# Patient Record
Sex: MALE | Race: WHITE | Employment: OTHER | URBAN - METROPOLITAN AREA
[De-identification: names, ages, dates, MRNs, and addresses within clinical notes are randomized per-mention and may not be internally consistent; named-entity substitution may affect disease eponyms.]

---

## 2017-10-11 ENCOUNTER — NURSE ONLY (OUTPATIENT)
Dept: FAMILY MEDICINE CLINIC | Age: 77
End: 2017-10-11
Payer: MEDICARE

## 2017-10-11 DIAGNOSIS — Z23 NEED FOR INFLUENZA VACCINATION: Primary | ICD-10-CM

## 2017-10-11 PROCEDURE — G0008 ADMIN INFLUENZA VIRUS VAC: HCPCS | Performed by: FAMILY MEDICINE

## 2017-10-11 PROCEDURE — 90686 IIV4 VACC NO PRSV 0.5 ML IM: CPT | Performed by: FAMILY MEDICINE

## 2017-10-26 ENCOUNTER — OFFICE VISIT (OUTPATIENT)
Dept: FAMILY MEDICINE CLINIC | Age: 77
End: 2017-10-26
Payer: MEDICARE

## 2017-10-26 VITALS
WEIGHT: 151 LBS | BODY MASS INDEX: 22.96 KG/M2 | HEART RATE: 75 BPM | SYSTOLIC BLOOD PRESSURE: 102 MMHG | OXYGEN SATURATION: 98 % | DIASTOLIC BLOOD PRESSURE: 64 MMHG

## 2017-10-26 DIAGNOSIS — B35.6 TINEA CRURIS: Primary | ICD-10-CM

## 2017-10-26 PROCEDURE — 99213 OFFICE O/P EST LOW 20 MIN: CPT | Performed by: FAMILY MEDICINE

## 2017-10-26 RX ORDER — NYSTATIN 100000 [USP'U]/G
POWDER TOPICAL
Qty: 45 G | Refills: 1 | Status: SHIPPED | OUTPATIENT
Start: 2017-10-26 | End: 2018-01-03 | Stop reason: CLARIF

## 2017-10-26 NOTE — PROGRESS NOTES
Patient presents today for a possible burn on his groin area. Patient states he spilled Hot coffee on the area about a few months ago. Patient denies trouble urinating.

## 2017-10-26 NOTE — PROGRESS NOTES
HPI Notes    Name: Khanh Gutierrez  : 1940         Chief Complaint:     Chief Complaint   Patient presents with    Burn     groin        History of Present Illness:      Khanh Gutierrez is a 78 y.o.  male who presents with Burn (groin )      Rash   This is a new problem. The current episode started more than 1 month ago. The problem has been gradually worsening since onset. The affected locations include the groin. The rash is characterized by scaling and redness. He was exposed to nothing. Pertinent negatives include no cough, diarrhea, fever, shortness of breath or vomiting. Past treatments include anti-itch cream. The treatment provided no relief. No other acute problems or complaints. Past Medical History:     Past Medical History:   Diagnosis Date    GERD (gastroesophageal reflux disease)     Hyperlipidemia       Reviewed all health maintenance requirements and ordered appropriate tests  There are no preventive care reminders to display for this patient. Past Surgical History:     Past Surgical History:   Procedure Laterality Date    COLONOSCOPY      polyp    COLONOSCOPY  2016    sigmoid diverticula    INTRACAPSULAR CATARACT EXTRACTION      Bilat    TONSILLECTOMY          Medications:       Prior to Admission medications    Medication Sig Start Date End Date Taking? Authorizing Provider   nystatin (MYCOSTATIN) 400805 UNIT/GM powder Apply 3 times daily. 10/26/17  Yes Phyllistine Lennox, DO   ezetimibe (ZETIA) 10 MG tablet Take 1 tablet by mouth daily 16  Yes Phyllistine Lennox, DO   levocetirizine (XYZAL) 5 MG tablet Take 1 tablet by mouth nightly 16  Yes Lloyd Ruelas CNP   calcium carbonate (OSCAL) 500 MG TABS tablet Take 500 mg by mouth daily   Yes Historical Provider, MD        Allergies:       Review of patient's allergies indicates no known allergies. Social History:     Tobacco:    reports that he quit smoking about 47 years ago.  He does not have any smokeless tobacco history on file. Alcohol:      reports that he drinks about 0.6 oz of alcohol per week . Drug Use:  reports that he does not use drugs. Family History:     Family History   Problem Relation Age of Onset   Natick Maclachlan Parkinsonism Mother     Heart Disease Father     Crohn's Disease Sister        Review of Systems:     Positive and Negative as described in HPI    Review of Systems   Constitutional: Negative for activity change, appetite change, fever and unexpected weight change. HENT: Negative for ear pain, facial swelling and trouble swallowing. Eyes: Negative for photophobia and visual disturbance. Respiratory: Negative for cough, shortness of breath and wheezing. Cardiovascular: Negative for chest pain and palpitations. Gastrointestinal: Negative for abdominal distention, abdominal pain, constipation, diarrhea, nausea and vomiting. Endocrine: Negative for polydipsia, polyphagia and polyuria. Musculoskeletal: Negative for arthralgias, back pain, gait problem, joint swelling, myalgias, neck pain and neck stiffness. Skin: Positive for rash. Negative for color change. Allergic/Immunologic: Negative for environmental allergies and food allergies. Neurological: Negative for dizziness, syncope, weakness, light-headedness and headaches. Psychiatric/Behavioral: Negative for dysphoric mood. The patient is not nervous/anxious. Physical Exam:     Physical Exam   Constitutional: He is oriented to person, place, and time. He appears well-developed and well-nourished. HENT:   Head: Normocephalic and atraumatic. Right Ear: External ear normal.   Left Ear: External ear normal.   Eyes: Conjunctivae and EOM are normal.   Neck: Normal range of motion. Neck supple. No thyromegaly present. Cardiovascular: Normal rate, regular rhythm and normal heart sounds. Exam reveals no gallop and no friction rub. No murmur heard.   Pulmonary/Chest: Effort normal and breath sounds normal. No respiratory

## 2017-11-05 ASSESSMENT — ENCOUNTER SYMPTOMS
WHEEZING: 0
CONSTIPATION: 0
VOMITING: 0
NAUSEA: 0
FACIAL SWELLING: 0
PHOTOPHOBIA: 0
BACK PAIN: 0
ABDOMINAL DISTENTION: 0
COUGH: 0
SHORTNESS OF BREATH: 0
COLOR CHANGE: 0
DIARRHEA: 0
TROUBLE SWALLOWING: 0
ABDOMINAL PAIN: 0

## 2017-11-20 ENCOUNTER — TELEPHONE (OUTPATIENT)
Dept: FAMILY MEDICINE CLINIC | Age: 77
End: 2017-11-20

## 2017-11-20 DIAGNOSIS — E78.00 PURE HYPERCHOLESTEROLEMIA: ICD-10-CM

## 2017-11-20 RX ORDER — EZETIMIBE 10 MG/1
10 TABLET ORAL DAILY
Qty: 90 TABLET | Refills: 3 | Status: SHIPPED | OUTPATIENT
Start: 2017-11-20 | End: 2018-11-08 | Stop reason: SDUPTHER

## 2017-11-20 NOTE — TELEPHONE ENCOUNTER
Patient is asking for a refill on Zetia 10 mg take 1 tablet by mouth daily. Patient last seen 10/26/17. [facility]    Health Maintenance   Topic Date Due    DTaP/Tdap/Td vaccine (2 - Td) 09/09/2019    Lipid screen  11/04/2021    Colon cancer screen colonoscopy  01/04/2026    Zostavax vaccine  Completed    Flu vaccine  Completed    Pneumococcal low/med risk  Completed    AAA screen  Completed             (applicable per patient's age: Cancer Screenings, Depression Screening, Fall Risk Screening, Immunizations)    Hemoglobin A1C (%)   Date Value   11/04/2016 5.7   11/20/2015 6.0   07/03/2012 6.2 (H)     LDL Cholesterol (mg/dL)   Date Value   07/03/2012 121     LDL Calculated (mg/dL)   Date Value   11/04/2016 145     AST (U/L)   Date Value   11/04/2016 25     ALT (U/L)   Date Value   11/04/2016 15     BUN (mg/dL)   Date Value   11/04/2016 18      (goal A1C is < 7)   (goal LDL is <100) need 30-50% reduction from baseline     BP Readings from Last 3 Encounters:   10/26/17 102/64   11/22/16 102/66   09/24/16 129/79    (goal /80)      All Future Testing planned in [facility]:      Next Visit Date:  No future appointments.          Patient Active Problem List:     Pure hypercholesterolemia     Esophageal reflux     Other abnormal glucose     Hypertrophy of prostate without urinary obstruction and other lower urinary tract symptoms (LUTS)     Encounter for screening colonoscopy     History of colon polyps

## 2017-11-30 LAB
ALT SERPL-CCNC: 17 U/L
AST SERPL-CCNC: 24 U/L
AVERAGE GLUCOSE: NORMAL
BASOPHILS ABSOLUTE: NORMAL /ΜL
BASOPHILS RELATIVE PERCENT: NORMAL %
BUN BLDV-MCNC: 16 MG/DL
CALCIUM SERPL-MCNC: 9.4 MG/DL
CHLORIDE BLD-SCNC: 102 MMOL/L
CHOLESTEROL, TOTAL: 209 MG/DL
CHOLESTEROL/HDL RATIO: 4.4
CO2: 23 MMOL/L
CREAT SERPL-MCNC: 1.11 MG/DL
EOSINOPHILS ABSOLUTE: NORMAL /ΜL
EOSINOPHILS RELATIVE PERCENT: NORMAL %
GFR CALCULATED: >60
GLUCOSE BLD-MCNC: 106 MG/DL
HBA1C MFR BLD: 5.6 %
HCT VFR BLD CALC: 46.4 % (ref 41–53)
HDLC SERPL-MCNC: 47 MG/DL (ref 35–70)
HEMOGLOBIN: 15.6 G/DL (ref 13.5–17.5)
LDL CHOLESTEROL CALCULATED: 136 MG/DL (ref 0–160)
LYMPHOCYTES ABSOLUTE: NORMAL /ΜL
LYMPHOCYTES RELATIVE PERCENT: NORMAL %
MCH RBC QN AUTO: 30.7 PG
MCHC RBC AUTO-ENTMCNC: 33.5 G/DL
MCV RBC AUTO: 91.5 FL
MONOCYTES ABSOLUTE: NORMAL /ΜL
MONOCYTES RELATIVE PERCENT: NORMAL %
NEUTROPHILS ABSOLUTE: NORMAL /ΜL
NEUTROPHILS RELATIVE PERCENT: NORMAL %
PLATELET # BLD: 229 K/ΜL
PMV BLD AUTO: NORMAL FL
POTASSIUM SERPL-SCNC: 4.6 MMOL/L
RBC # BLD: 5.07 10^6/ΜL
SODIUM BLD-SCNC: 141 MMOL/L
TRIGL SERPL-MCNC: 128 MG/DL
TSH SERPL DL<=0.05 MIU/L-ACNC: 3.48 UIU/ML
VLDLC SERPL CALC-MCNC: NORMAL MG/DL
WBC # BLD: 5 10^3/ML

## 2017-12-06 ENCOUNTER — OFFICE VISIT (OUTPATIENT)
Dept: FAMILY MEDICINE CLINIC | Age: 77
End: 2017-12-06
Payer: MEDICARE

## 2017-12-06 VITALS
OXYGEN SATURATION: 99 % | BODY MASS INDEX: 22.81 KG/M2 | HEART RATE: 58 BPM | WEIGHT: 150 LBS | SYSTOLIC BLOOD PRESSURE: 115 MMHG | DIASTOLIC BLOOD PRESSURE: 62 MMHG

## 2017-12-06 DIAGNOSIS — R21 RASH: Primary | ICD-10-CM

## 2017-12-06 DIAGNOSIS — N50.89 SWELLING, SCROTUM: ICD-10-CM

## 2017-12-06 PROCEDURE — 99213 OFFICE O/P EST LOW 20 MIN: CPT | Performed by: FAMILY MEDICINE

## 2017-12-06 NOTE — PROGRESS NOTES
Patient presents today for Annual Exam.
Date    CHOL 209 11/30/2017    HDL 47 11/30/2017    PSA 2.82 11/13/2014    LABA1C 5.6 11/30/2017          Assessment:       1. Rash     2. Swelling, scrotum  US SCROTUM AND TESTICLES       Plan:   1. Rash-likely tinea cruris-improving/resolving. Recommend patient use the Lamisil he has at home to finish the resolution of this rash. 2.  Swelling of the scrotum-will get ultrasound of the scrotum and testicles to evaluate for scrotal mass. Addendum: Patient with a large cystic scrotal mass in the right scrotum. Will refer patient to urology for further evaluation and management. Completed Refills   Requested Prescriptions      No prescriptions requested or ordered in this encounter     Return if symptoms worsen or fail to improve. No orders of the defined types were placed in this encounter. Orders Placed This Encounter   Procedures    US SCROTUM AND TESTICLES     Standing Status:   Future     Number of Occurrences:   1     Standing Expiration Date:   12/6/2018     Order Specific Question:   Reason for exam:     Answer:   right scrotal swelling         There are no Patient Instructions on file for this visit. Electronically signed by Amena Bolden DO on 12/17/2017 at 9:08 PM         Completed Refills   Requested Prescriptions      No prescriptions requested or ordered in this encounter         Gris Felderu received counseling on the following healthy behaviors: nutrition and exercise  Reviewed prior labs and health maintenance. Continue current medications, diet and exercise. Discussed use, benefit, and side effects of prescribed medications. Barriers to medication compliance addressed. Patient given educational materials - see patient instructions. All patient questions answered. Patient voiced understanding.

## 2017-12-11 ENCOUNTER — HOSPITAL ENCOUNTER (OUTPATIENT)
Dept: ULTRASOUND IMAGING | Age: 77
Discharge: HOME | End: 2017-12-11
Payer: MEDICARE

## 2017-12-11 DIAGNOSIS — N50.89 SWELLING, SCROTUM: ICD-10-CM

## 2017-12-11 PROCEDURE — 76870 US EXAM SCROTUM: CPT

## 2017-12-13 ENCOUNTER — TELEPHONE (OUTPATIENT)
Dept: FAMILY MEDICINE CLINIC | Age: 77
End: 2017-12-13

## 2017-12-13 DIAGNOSIS — N50.89 SCROTAL MASS: Primary | ICD-10-CM

## 2017-12-17 ASSESSMENT — ENCOUNTER SYMPTOMS
BACK PAIN: 0
WHEEZING: 0
ABDOMINAL DISTENTION: 0
COLOR CHANGE: 0
CONSTIPATION: 0
DIARRHEA: 0
COUGH: 0
NAUSEA: 0
FACIAL SWELLING: 0
VOMITING: 0
ABDOMINAL PAIN: 0
PHOTOPHOBIA: 0
TROUBLE SWALLOWING: 0
SHORTNESS OF BREATH: 0

## 2017-12-21 ENCOUNTER — OFFICE VISIT (OUTPATIENT)
Dept: UROLOGY | Age: 77
End: 2017-12-21
Payer: MEDICARE

## 2017-12-21 VITALS — DIASTOLIC BLOOD PRESSURE: 70 MMHG | BODY MASS INDEX: 22.66 KG/M2 | WEIGHT: 149 LBS | SYSTOLIC BLOOD PRESSURE: 120 MMHG

## 2017-12-21 DIAGNOSIS — N50.82 SCROTAL PAIN: ICD-10-CM

## 2017-12-21 DIAGNOSIS — N50.3 EPIDIDYMAL CYST: Primary | ICD-10-CM

## 2017-12-21 PROCEDURE — 99204 OFFICE O/P NEW MOD 45 MIN: CPT | Performed by: NURSE PRACTITIONER

## 2017-12-21 NOTE — PROGRESS NOTES
Apply 3 times daily. 45 g 1    levocetirizine (XYZAL) 5 MG tablet Take 1 tablet by mouth nightly 90 tablet 1     No current facility-administered medications on file prior to visit. Seasonal  Family History   Problem Relation Age of Onset   [facility] Parkinsonism Mother     Heart Disease Father     Crohn's Disease Sister      History   Smoking Status    Former Smoker    Quit date: 1/4/1970   Smokeless Tobacco    Never Used        History   Alcohol Use    0.6 oz/week    1 Glasses of wine per week       Vitals:    12/21/17 1505   BP: 120/70     Constitutional: Patient in no acute distress; Neuro: alert and oriented to person place and time. Psych: Mood and affect normal.  Skin: Normal  Lungs: Respiratory effort normal  Cardiovascular:  Normal peripheral pulses  Abdomen: Soft, non-tender, non-distended with no CVA, flank pain, hepatosplenomegaly or hernia. Kidneys normal.  Bladder non-tender and not distended. Lymphatics: no palpable lymphadenopathy  Penis normal and circumcised  Urethral meatus normal  Scrotal large right scrotal mass. This is separate from the testicle. Multiple right epididymal cysts are palpated as well. Scrotal petechiae. Testicles normal bilaterally. No pain with palpation of the actual testicles. Epididymis normal bilaterally  No evidence of inguinal hernia    No results found for this visit on 12/21/17. Lab Results   Component Value Date    PSA 2.82 11/13/2014     Lab Results   Component Value Date    BUN 16 11/30/2017     Lab Results   Component Value Date    CREATININE 1.11 11/30/2017     Review of Systems   Constitutional: Negative for appetite change, chills and fever. Eyes: Negative for pain, redness and visual disturbance. Respiratory: Negative for cough, shortness of breath and wheezing. Cardiovascular: Negative for chest pain and leg swelling. Gastrointestinal: Negative for abdominal pain, constipation, nausea and vomiting.    Genitourinary: Positive for scrotal swelling. Negative for decreased urine volume, difficulty urinating, discharge, dysuria, enuresis, flank pain, frequency, hematuria, penile pain, testicular pain and urgency. Musculoskeletal: Negative for back pain, joint swelling and myalgias. Skin: Negative for color change, rash and wound. Neurological: Negative for dizziness, tremors and numbness. Hematological: Negative for adenopathy. Does not bruise/bleed easily. Objective:   Physical Exam    Assessment:      1. Epididymal cyst     2. Scrotal pain             Plan:      Patient I had a long discussion about surgical intervention versus monitoring of the scrotal cyst and epididymal cysts. Patient is leaving for a vacation at the beginning of February in [place]. I did explain there is a possibility that he will not be completely recovered from surgery. Patient has only minimal discomfort at this time, so we will plan to have patient back when he returns from his trip and we will discuss surgical planning at that time. I did urge him to call our office immediately if his pain or swelling becomes worse.

## 2017-12-22 PROBLEM — N50.82 SCROTAL PAIN: Status: ACTIVE | Noted: 2017-12-22

## 2017-12-22 PROBLEM — N50.3 EPIDIDYMAL CYST: Status: ACTIVE | Noted: 2017-12-22

## 2017-12-22 ASSESSMENT — ENCOUNTER SYMPTOMS
SHORTNESS OF BREATH: 0
ABDOMINAL PAIN: 0
BACK PAIN: 0
NAUSEA: 0
COLOR CHANGE: 0
WHEEZING: 0
CONSTIPATION: 0
EYE REDNESS: 0
EYE PAIN: 0
COUGH: 0
VOMITING: 0

## 2018-01-03 ENCOUNTER — OFFICE VISIT (OUTPATIENT)
Dept: PRIMARY CARE CLINIC | Age: 78
End: 2018-01-03
Payer: MEDICARE

## 2018-01-03 VITALS
DIASTOLIC BLOOD PRESSURE: 79 MMHG | SYSTOLIC BLOOD PRESSURE: 131 MMHG | BODY MASS INDEX: 21.92 KG/M2 | OXYGEN SATURATION: 98 % | HEART RATE: 66 BPM | RESPIRATION RATE: 20 BRPM | HEIGHT: 69 IN | WEIGHT: 148 LBS | TEMPERATURE: 98.6 F

## 2018-01-03 DIAGNOSIS — J01.40 ACUTE NON-RECURRENT PANSINUSITIS: Primary | ICD-10-CM

## 2018-01-03 PROCEDURE — 99213 OFFICE O/P EST LOW 20 MIN: CPT | Performed by: NURSE PRACTITIONER

## 2018-01-03 RX ORDER — FLUTICASONE PROPIONATE 50 MCG
1 SPRAY, SUSPENSION (ML) NASAL DAILY
Qty: 1 BOTTLE | Refills: 0 | Status: SHIPPED | OUTPATIENT
Start: 2018-01-03 | End: 2018-03-08

## 2018-01-03 RX ORDER — AMOXICILLIN AND CLAVULANATE POTASSIUM 875; 125 MG/1; MG/1
1 TABLET, FILM COATED ORAL 2 TIMES DAILY
Qty: 20 TABLET | Refills: 0 | Status: SHIPPED | OUTPATIENT
Start: 2018-01-03 | End: 2018-01-13

## 2018-01-03 ASSESSMENT — ENCOUNTER SYMPTOMS
STRIDOR: 0
SINUS PRESSURE: 1
SINUS PAIN: 1
WHEEZING: 0
COUGH: 1
SORE THROAT: 0
SHORTNESS OF BREATH: 0

## 2018-01-03 NOTE — PROGRESS NOTES
Skin: Negative. Hematological: Negative. Objective:     Physical Exam   Constitutional: He appears well-developed and well-nourished. He is cooperative. Appearing to be of stated age with warm and dry skin, no apparent distress; well-appearing, well-hydrated, non-toxic, comfortable, alert and oriented, pleasant and talkative, in no apparent distress. HENT:   Head: Normocephalic. Right Ear: Tympanic membrane normal.   Left Ear: Tympanic membrane normal.   Nose: Mucosal edema present. Right sinus exhibits maxillary sinus tenderness. Mouth/Throat: Uvula is midline and mucous membranes are normal.   Cardiovascular: Normal rate, regular rhythm and normal heart sounds. No murmur heard. No lower extremity edema bilaterally. Pulmonary/Chest: Effort normal and breath sounds normal.    Normal respiratory effort. Lungs clear to auscultation over anterior and posterior lung fields bilaterally. No rales, rhonchi or wheezing. No SOB or coughing noted during exam conversation. Nursing note and vitals reviewed. /79   Pulse 66   Temp 98.6 °F (37 °C) (Oral)   Resp 20   Ht 5' 9\" (1.753 m)   Wt 148 lb (67.1 kg)   SpO2 98%   BMI 21.86 kg/m²     Assessment:     1. Acute non-recurrent pansinusitis  amoxicillin-clavulanate (AUGMENTIN) 875-125 MG per tablet    fluticasone (FLONASE) 50 MCG/ACT nasal spray       Plan:   [place]ron [place] was seen today for cough, chest congestion and sinusitis. Diagnoses and all orders for this visit:    Acute non-recurrent pansinusitis  -     amoxicillin-clavulanate (AUGMENTIN) 875-125 MG per tablet; Take 1 tablet by mouth 2 times daily for 10 days  -     fluticasone (FLONASE) 50 MCG/ACT nasal spray; 1 spray by Nasal route daily    Discussed and recommended a daily probiotic OTC or yogurt (Activia, live cultured yogurt, greek). NP discussed administration and side effects of both prescribed medications. Understanding voiced.   Written instructions

## 2018-01-03 NOTE — PATIENT INSTRUCTIONS
amoxicillin and clavulanate potassium? Do not use this medication if you are allergic to amoxicillin or clavulanate potassium, or if you have ever had liver problems caused by this medication. Do not use if you are allergic to any other penicillin antibiotic, such as amoxicillin (Amoxil, Augmentin, Dispermox, Moxatag), ampicillin (Principen, Unasyn), dicloxacillin (Dycill, Dynapen), oxacillin (Bactocill), or penicillin (Bicillin L-A, PC Pen VK, Pfizerpen)), and others. To make sure you can safely take this medicine, tell your doctor if you have any of these other conditions:  · liver disease (or a history of hepatitis or jaundice);  · kidney disease;  · mononucleosis; or  · if you are allergic to a cephalosporin antibiotic, such as cefdinir (Omnicef), cefprozil (Cefzil), cefuroxime (Ceftin), cephalexin (Keflex), and others. FDA pregnancy category B. This medication is not expected to be harmful to an unborn baby. Tell your doctor if you are pregnant or plan to become pregnant during treatment. Amoxicillin and clavulanate potassium can make birth control pills less effective. Ask your doctor about using a non-hormone method of birth control (such as a condom, diaphragm, spermicide) to prevent pregnancy while taking amoxicillin and clavulanate potassium. Amoxicillin and clavulanate potassium can pass into breast milk and may harm a nursing baby. Do not use this medication without telling your doctor if you are breast-feeding a baby. The liquid and chewable tablet forms of this medication may contain phenylalanine. Talk to your doctor before using these forms of amoxicillin and clavulanate potassium if you have phenylketonuria (PKU). How should I take amoxicillin and clavulanate potassium? Take exactly as prescribed by your doctor. Do not take in larger or smaller amounts or for longer than recommended. Follow the directions on your prescription label.   If you switch from one tablet form to another (regular, unused liquid after 10 days. What happens if I miss a dose? Take the missed dose as soon as you remember. Skip the missed dose if it is almost time for your next scheduled dose. Do not take extra medicine to make up the missed dose. What happens if I overdose? Seek emergency medical attention or call the Poison Help line at 1-178.958.2949. Overdose can cause nausea, vomiting, stomach pain, diarrhea, skin rash, drowsiness, and hyperactivity. What should I avoid while taking amoxicillin and clavulanate potassium? Antibiotic medicines can cause diarrhea, which may be a sign of a new infection. If you have diarrhea that is watery or has blood in it, stop taking this medication and call your doctor. Do not use any medicine to stop the diarrhea unless your doctor has told you to. What are the possible side effects of amoxicillin and clavulanate potassium? Get emergency medical help if you have any of these signs of an allergic reaction: hives; difficulty breathing; swelling of your face, lips, tongue, or throat. Stop using this medicine and call your doctor at once if you have a serious side effect such as:  · diarrhea that is watery or has blood in it;  · pale or yellowed skin, dark colored urine, fever, confusion or weakness;  · easy bruising or bleeding;  · skin rash, bruising, severe tingling, numbness, pain, muscle weakness;  · agitation, confusion, unusual thoughts or behavior, seizure (convulsions);  · nausea, upper stomach pain, itching, loss of appetite, dark urine, corina-colored stools, jaundice (yellowing of the skin or eyes); or  · severe skin reaction -- fever, sore throat, swelling in your face or tongue, burning in your eyes, skin pain, followed by a red or purple skin rash that spreads (especially in the face or upper body) and causes blistering and peeling.   Less serious side effects may include:  · mild diarrhea, gas, stomach pain;  · nausea or vomiting;  · headache;  · skin rash or itching;  · white patches in your mouth or throat; or  · vaginal yeast infection (itching or discharge). This is not a complete list of side effects and others may occur. Call your doctor for medical advice about side effects. You may report side effects to FDA at 0-870-FDA-9444. What other drugs will affect amoxicillin and clavulanate potassium? Tell your doctor about all other medications you use, especially:  · allopurinol (Zyloprim);  · probenecid (Benemid);  · a blood thinner such as warfarin (Coumadin, Jantoven); or  · another antibiotic (for the same or for a different infection). This list is not complete and other drugs may interact with amoxicillin and clavulanate potassium. Tell your doctor about all medications you use. This includes prescription, over-the-counter, vitamin, and herbal products. Do not start a new medication without telling your doctor. Where can I get more information? Your pharmacist can provide more information about amoxicillin and clavulanate potassium. Remember, keep this and all other medicines out of the reach of children, never share your medicines with others, and use this medication only for the indication prescribed. Every effort has been made to ensure that the information provided by [address] is accurate, up-to-date, and complete, but no guarantee is made to that effect. Drug information contained herein may be time sensitive. [employer] information has been compiled for use by healthcare practitioners and consumers in the [place] and therefore [employer] does not warrant that uses outside of the [place] are appropriate, unless specifically indicated otherwise. [employer] drug information does not endorse drugs, diagnose patients or recommend therapy.  [employer] drug information is an informational resource designed to assist licensed healthcare practitioners in caring for their patients and/or to serve consumers viewing this service as a

## 2018-03-08 ENCOUNTER — OFFICE VISIT (OUTPATIENT)
Dept: UROLOGY | Age: 78
End: 2018-03-08
Payer: MEDICARE

## 2018-03-08 VITALS — WEIGHT: 153 LBS | SYSTOLIC BLOOD PRESSURE: 126 MMHG | BODY MASS INDEX: 22.59 KG/M2 | DIASTOLIC BLOOD PRESSURE: 68 MMHG

## 2018-03-08 DIAGNOSIS — N50.3 EPIDIDYMAL CYST: Primary | ICD-10-CM

## 2018-03-08 PROCEDURE — 99213 OFFICE O/P EST LOW 20 MIN: CPT | Performed by: UROLOGY

## 2018-03-08 ASSESSMENT — ENCOUNTER SYMPTOMS
WHEEZING: 0
VOMITING: 0
BACK PAIN: 0
EYE PAIN: 0
ABDOMINAL PAIN: 0
SHORTNESS OF BREATH: 0
COLOR CHANGE: 0
COUGH: 0
NAUSEA: 0
EYE REDNESS: 0

## 2018-10-03 ENCOUNTER — TELEPHONE (OUTPATIENT)
Dept: FAMILY MEDICINE CLINIC | Age: 78
End: 2018-10-03

## 2018-10-03 DIAGNOSIS — Z23 NEED FOR SHINGLES VACCINE: Primary | ICD-10-CM

## 2018-10-18 ENCOUNTER — IMMUNIZATION (OUTPATIENT)
Dept: FAMILY MEDICINE CLINIC | Age: 78
End: 2018-10-18
Payer: MEDICARE

## 2018-10-18 DIAGNOSIS — Z23 NEED FOR INFLUENZA VACCINATION: Primary | ICD-10-CM

## 2018-10-18 PROCEDURE — 90686 IIV4 VACC NO PRSV 0.5 ML IM: CPT | Performed by: FAMILY MEDICINE

## 2018-10-18 PROCEDURE — G0008 ADMIN INFLUENZA VIRUS VAC: HCPCS | Performed by: FAMILY MEDICINE

## 2018-11-01 LAB
AVERAGE GLUCOSE: NORMAL
BUN BLDV-MCNC: 18 MG/DL
CALCIUM SERPL-MCNC: 9.5 MG/DL
CHLORIDE BLD-SCNC: 100 MMOL/L
CHOLESTEROL, TOTAL: 229 MG/DL
CHOLESTEROL/HDL RATIO: 4.5
CO2: 26 MMOL/L
CREAT SERPL-MCNC: 1.09 MG/DL
GFR CALCULATED: NORMAL
GLUCOSE BLD-MCNC: 100 MG/DL
HBA1C MFR BLD: 5.4 %
HDLC SERPL-MCNC: 51 MG/DL (ref 35–70)
LDL CHOLESTEROL CALCULATED: 146 MG/DL (ref 0–160)
POTASSIUM SERPL-SCNC: 4.1 MMOL/L
PROSTATE SPECIFIC ANTIGEN: 3.82 NG/ML
SODIUM BLD-SCNC: 140 MMOL/L
TRIGL SERPL-MCNC: 162 MG/DL
TSH SERPL DL<=0.05 MIU/L-ACNC: 3.56 UIU/ML
VLDLC SERPL CALC-MCNC: NORMAL MG/DL

## 2018-11-08 DIAGNOSIS — E78.00 PURE HYPERCHOLESTEROLEMIA: ICD-10-CM

## 2018-11-08 RX ORDER — EZETIMIBE 10 MG/1
10 TABLET ORAL DAILY
Qty: 90 TABLET | Refills: 3 | Status: SHIPPED | OUTPATIENT
Start: 2018-11-08 | End: 2019-11-06 | Stop reason: SDUPTHER

## 2018-11-08 NOTE — TELEPHONE ENCOUNTER
Requesting a refill on Zetia 10 mg    [facility]    Health Maintenance   Topic Date Due    Shingles Vaccine (1 of 2 - 2 Dose Series) 02/12/2012    DTaP/Tdap/Td vaccine (2 - Td) 09/09/2019    Flu vaccine  Completed    Pneumococcal low/med risk  Completed             (applicable per patient's age: Cancer Screenings, Depression Screening, Fall Risk Screening, Immunizations)    Hemoglobin A1C (%)   Date Value   11/30/2017 5.6   11/04/2016 5.7   11/20/2015 6.0     LDL Cholesterol (mg/dL)   Date Value   07/03/2012 121     LDL Calculated (mg/dL)   Date Value   11/30/2017 136     AST (U/L)   Date Value   11/30/2017 24     ALT (U/L)   Date Value   11/30/2017 17     BUN (mg/dL)   Date Value   11/30/2017 16      (goal A1C is < 7)   (goal LDL is <100) need 30-50% reduction from baseline     BP Readings from Last 3 Encounters:   03/08/18 126/68   01/03/18 131/79   12/21/17 120/70    (goal /80)      All Future Testing planned in [facility]:      Next Visit Date:  No future appointments.          Patient Active Problem List:     Pure hypercholesterolemia     Esophageal reflux     Other abnormal glucose     Hypertrophy of prostate without urinary obstruction and other lower urinary tract symptoms (LUTS)     History of colon polyps     Epididymal cyst     Scrotal pain

## 2018-12-10 ENCOUNTER — OFFICE VISIT (OUTPATIENT)
Dept: FAMILY MEDICINE CLINIC | Age: 78
End: 2018-12-10
Payer: MEDICARE

## 2018-12-10 VITALS
WEIGHT: 148 LBS | BODY MASS INDEX: 21.92 KG/M2 | HEIGHT: 69 IN | OXYGEN SATURATION: 98 % | DIASTOLIC BLOOD PRESSURE: 70 MMHG | SYSTOLIC BLOOD PRESSURE: 126 MMHG | HEART RATE: 60 BPM

## 2018-12-10 DIAGNOSIS — K60.2 RECTAL FISSURE: ICD-10-CM

## 2018-12-10 DIAGNOSIS — J30.2 SEASONAL ALLERGIES: ICD-10-CM

## 2018-12-10 DIAGNOSIS — E78.00 PURE HYPERCHOLESTEROLEMIA: Primary | ICD-10-CM

## 2018-12-10 DIAGNOSIS — K21.9 GASTROESOPHAGEAL REFLUX DISEASE WITHOUT ESOPHAGITIS: ICD-10-CM

## 2018-12-10 PROBLEM — K64.8 OTHER HEMORRHOIDS: Status: ACTIVE | Noted: 2018-12-10

## 2018-12-10 PROCEDURE — 99213 OFFICE O/P EST LOW 20 MIN: CPT | Performed by: FAMILY MEDICINE

## 2018-12-10 ASSESSMENT — ENCOUNTER SYMPTOMS
HEARTBURN: 0
DIARRHEA: 0
CONSTIPATION: 0
ABDOMINAL PAIN: 0
TROUBLE SWALLOWING: 0
EYE DISCHARGE: 0
FACIAL SWELLING: 0
SHORTNESS OF BREATH: 0
BLOOD IN STOOL: 0
NAUSEA: 0
EYE REDNESS: 0
VOMITING: 0

## 2018-12-10 ASSESSMENT — PATIENT HEALTH QUESTIONNAIRE - PHQ9
1. LITTLE INTEREST OR PLEASURE IN DOING THINGS: 0
SUM OF ALL RESPONSES TO PHQ QUESTIONS 1-9: 0
SUM OF ALL RESPONSES TO PHQ9 QUESTIONS 1 & 2: 0
SUM OF ALL RESPONSES TO PHQ QUESTIONS 1-9: 0
2. FEELING DOWN, DEPRESSED OR HOPELESS: 0

## 2019-01-16 ENCOUNTER — TELEPHONE (OUTPATIENT)
Dept: FAMILY MEDICINE CLINIC | Age: 79
End: 2019-01-16

## 2019-01-16 RX ORDER — ATOVAQUONE AND PROGUANIL HYDROCHLORIDE 250; 100 MG/1; MG/1
1 TABLET, FILM COATED ORAL DAILY
Qty: 20 TABLET | Refills: 0 | Status: SHIPPED | OUTPATIENT
Start: 2019-01-16 | End: 2019-10-24

## 2019-10-09 ENCOUNTER — IMMUNIZATION (OUTPATIENT)
Dept: FAMILY MEDICINE CLINIC | Age: 79
End: 2019-10-09
Payer: MEDICARE

## 2019-10-09 DIAGNOSIS — Z23 NEED FOR INFLUENZA VACCINATION: Primary | ICD-10-CM

## 2019-10-09 PROCEDURE — G0008 ADMIN INFLUENZA VIRUS VAC: HCPCS | Performed by: FAMILY MEDICINE

## 2019-10-09 PROCEDURE — 90686 IIV4 VACC NO PRSV 0.5 ML IM: CPT | Performed by: FAMILY MEDICINE

## 2019-10-24 ENCOUNTER — OFFICE VISIT (OUTPATIENT)
Dept: PRIMARY CARE CLINIC | Age: 79
End: 2019-10-24
Payer: MEDICARE

## 2019-10-24 VITALS
DIASTOLIC BLOOD PRESSURE: 75 MMHG | TEMPERATURE: 98.1 F | BODY MASS INDEX: 22 KG/M2 | SYSTOLIC BLOOD PRESSURE: 126 MMHG | HEART RATE: 50 BPM | OXYGEN SATURATION: 99 % | WEIGHT: 149 LBS

## 2019-10-24 DIAGNOSIS — J01.00 ACUTE MAXILLARY SINUSITIS, RECURRENCE NOT SPECIFIED: Primary | ICD-10-CM

## 2019-10-24 PROCEDURE — 99213 OFFICE O/P EST LOW 20 MIN: CPT | Performed by: NURSE PRACTITIONER

## 2019-10-24 RX ORDER — FLUTICASONE PROPIONATE 50 MCG
2 SPRAY, SUSPENSION (ML) NASAL DAILY
Qty: 1 BOTTLE | Refills: 0 | Status: SHIPPED | OUTPATIENT
Start: 2019-10-24 | End: 2020-02-24 | Stop reason: SDUPTHER

## 2019-10-24 RX ORDER — AMOXICILLIN AND CLAVULANATE POTASSIUM 875; 125 MG/1; MG/1
1 TABLET, FILM COATED ORAL 2 TIMES DAILY
Qty: 20 TABLET | Refills: 0 | Status: SHIPPED | OUTPATIENT
Start: 2019-10-24 | End: 2019-11-03

## 2019-10-24 ASSESSMENT — ENCOUNTER SYMPTOMS
SHORTNESS OF BREATH: 0
COUGH: 1
SORE THROAT: 0
EYES NEGATIVE: 1
SINUS PAIN: 1
WHEEZING: 0
RHINORRHEA: 1
SINUS PRESSURE: 1
VOMITING: 1

## 2019-11-06 ENCOUNTER — OFFICE VISIT (OUTPATIENT)
Dept: FAMILY MEDICINE CLINIC | Age: 79
End: 2019-11-06
Payer: MEDICARE

## 2019-11-06 VITALS
HEART RATE: 56 BPM | BODY MASS INDEX: 22.59 KG/M2 | TEMPERATURE: 97.6 F | DIASTOLIC BLOOD PRESSURE: 60 MMHG | SYSTOLIC BLOOD PRESSURE: 124 MMHG | OXYGEN SATURATION: 99 % | WEIGHT: 153 LBS

## 2019-11-06 DIAGNOSIS — E78.00 PURE HYPERCHOLESTEROLEMIA: ICD-10-CM

## 2019-11-06 DIAGNOSIS — L50.9 URTICARIA: Primary | ICD-10-CM

## 2019-11-06 DIAGNOSIS — J30.2 OTHER SEASONAL ALLERGIC RHINITIS: ICD-10-CM

## 2019-11-06 PROCEDURE — 99213 OFFICE O/P EST LOW 20 MIN: CPT | Performed by: NURSE PRACTITIONER

## 2019-11-06 RX ORDER — FAMOTIDINE 20 MG/1
20 TABLET, FILM COATED ORAL 2 TIMES DAILY
Qty: 60 TABLET | Refills: 0 | Status: SHIPPED | OUTPATIENT
Start: 2019-11-06 | End: 2020-02-24 | Stop reason: ALTCHOICE

## 2019-11-06 RX ORDER — EZETIMIBE 10 MG/1
10 TABLET ORAL DAILY
Qty: 30 TABLET | Refills: 0 | Status: SHIPPED | OUTPATIENT
Start: 2019-11-06 | End: 2019-12-11 | Stop reason: SDUPTHER

## 2019-11-06 RX ORDER — LEVOCETIRIZINE DIHYDROCHLORIDE 5 MG/1
5 TABLET, FILM COATED ORAL NIGHTLY
Qty: 30 TABLET | Refills: 0 | Status: SHIPPED | OUTPATIENT
Start: 2019-11-06 | End: 2020-01-08 | Stop reason: ALTCHOICE

## 2019-11-06 RX ORDER — PREDNISONE 20 MG/1
40 TABLET ORAL DAILY
Qty: 6 TABLET | Refills: 0 | Status: SHIPPED | OUTPATIENT
Start: 2019-11-06 | End: 2019-11-09

## 2019-11-06 ASSESSMENT — ENCOUNTER SYMPTOMS
COUGH: 0
NAUSEA: 0
SHORTNESS OF BREATH: 0
DIARRHEA: 0
VOMITING: 0

## 2019-11-21 LAB
AVERAGE GLUCOSE: NORMAL
BUN BLDV-MCNC: 16 MG/DL
CALCIUM SERPL-MCNC: 10.1 MG/DL
CHLORIDE BLD-SCNC: 99 MMOL/L
CHOLESTEROL, TOTAL: 233 MG/DL
CHOLESTEROL/HDL RATIO: 4.7
CO2: 21 MMOL/L
CREAT SERPL-MCNC: 1.14 MG/DL
GFR CALCULATED: NORMAL
GLUCOSE BLD-MCNC: 118 MG/DL
HBA1C MFR BLD: 6 %
HDLC SERPL-MCNC: 50 MG/DL (ref 35–70)
LDL CHOLESTEROL CALCULATED: 158 MG/DL (ref 0–160)
POTASSIUM SERPL-SCNC: 4.2 MMOL/L
PROSTATE SPECIFIC ANTIGEN: 3.68 NG/ML
SODIUM BLD-SCNC: 139 MMOL/L
TRIGL SERPL-MCNC: 124 MG/DL
TSH SERPL DL<=0.05 MIU/L-ACNC: 2.99 UIU/ML
VLDLC SERPL CALC-MCNC: NORMAL MG/DL

## 2019-12-11 ENCOUNTER — OFFICE VISIT (OUTPATIENT)
Dept: FAMILY MEDICINE CLINIC | Age: 79
End: 2019-12-11
Payer: MEDICARE

## 2019-12-11 VITALS
WEIGHT: 152.7 LBS | HEART RATE: 64 BPM | DIASTOLIC BLOOD PRESSURE: 60 MMHG | BODY MASS INDEX: 22.62 KG/M2 | OXYGEN SATURATION: 98 % | SYSTOLIC BLOOD PRESSURE: 108 MMHG | HEIGHT: 69 IN

## 2019-12-11 DIAGNOSIS — Z00.00 MEDICARE ANNUAL WELLNESS VISIT, INITIAL: Primary | ICD-10-CM

## 2019-12-11 DIAGNOSIS — J30.2 OTHER SEASONAL ALLERGIC RHINITIS: ICD-10-CM

## 2019-12-11 DIAGNOSIS — E78.00 PURE HYPERCHOLESTEROLEMIA: ICD-10-CM

## 2019-12-11 PROCEDURE — G0438 PPPS, INITIAL VISIT: HCPCS | Performed by: FAMILY MEDICINE

## 2019-12-11 RX ORDER — EZETIMIBE 10 MG/1
10 TABLET ORAL DAILY
Qty: 90 TABLET | Refills: 1 | Status: SHIPPED | OUTPATIENT
Start: 2019-12-11 | End: 2020-05-26 | Stop reason: SDUPTHER

## 2019-12-11 RX ORDER — FAMOTIDINE 20 MG/1
20 TABLET, FILM COATED ORAL 2 TIMES DAILY
Qty: 60 TABLET | Refills: 0 | Status: CANCELLED | OUTPATIENT
Start: 2019-12-11

## 2019-12-11 RX ORDER — LEVOCETIRIZINE DIHYDROCHLORIDE 5 MG/1
5 TABLET, FILM COATED ORAL NIGHTLY
Qty: 30 TABLET | Refills: 0 | Status: CANCELLED | OUTPATIENT
Start: 2019-12-11

## 2019-12-11 RX ORDER — MONTELUKAST SODIUM 10 MG/1
10 TABLET ORAL NIGHTLY
Qty: 30 TABLET | Refills: 0 | Status: SHIPPED | OUTPATIENT
Start: 2019-12-11 | End: 2020-01-08

## 2019-12-11 ASSESSMENT — LIFESTYLE VARIABLES
HOW OFTEN DURING THE LAST YEAR HAVE YOU FOUND THAT YOU WERE NOT ABLE TO STOP DRINKING ONCE YOU HAD STARTED: 0
HOW MANY STANDARD DRINKS CONTAINING ALCOHOL DO YOU HAVE ON A TYPICAL DAY: 0
AUDIT-C TOTAL SCORE: 1
HOW OFTEN DURING THE LAST YEAR HAVE YOU BEEN UNABLE TO REMEMBER WHAT HAPPENED THE NIGHT BEFORE BECAUSE YOU HAD BEEN DRINKING: 0
HOW OFTEN DO YOU HAVE SIX OR MORE DRINKS ON ONE OCCASION: 0
HAS A RELATIVE, FRIEND, DOCTOR, OR ANOTHER HEALTH PROFESSIONAL EXPRESSED CONCERN ABOUT YOUR DRINKING OR SUGGESTED YOU CUT DOWN: 0
HAVE YOU OR SOMEONE ELSE BEEN INJURED AS A RESULT OF YOUR DRINKING: 0
AUDIT TOTAL SCORE: 1
HOW OFTEN DURING THE LAST YEAR HAVE YOU NEEDED AN ALCOHOLIC DRINK FIRST THING IN THE MORNING TO GET YOURSELF GOING AFTER A NIGHT OF HEAVY DRINKING: 0
HOW OFTEN DURING THE LAST YEAR HAVE YOU HAD A FEELING OF GUILT OR REMORSE AFTER DRINKING: 0
HOW OFTEN DO YOU HAVE A DRINK CONTAINING ALCOHOL: 1
HOW OFTEN DURING THE LAST YEAR HAVE YOU FAILED TO DO WHAT WAS NORMALLY EXPECTED FROM YOU BECAUSE OF DRINKING: 0

## 2019-12-11 ASSESSMENT — PATIENT HEALTH QUESTIONNAIRE - PHQ9
SUM OF ALL RESPONSES TO PHQ QUESTIONS 1-9: 1
SUM OF ALL RESPONSES TO PHQ QUESTIONS 1-9: 1

## 2020-01-08 ENCOUNTER — OFFICE VISIT (OUTPATIENT)
Dept: FAMILY MEDICINE CLINIC | Age: 80
End: 2020-01-08
Payer: MEDICARE

## 2020-01-08 VITALS
HEART RATE: 54 BPM | BODY MASS INDEX: 22.45 KG/M2 | SYSTOLIC BLOOD PRESSURE: 130 MMHG | WEIGHT: 152 LBS | DIASTOLIC BLOOD PRESSURE: 70 MMHG | OXYGEN SATURATION: 98 %

## 2020-01-08 PROCEDURE — 99213 OFFICE O/P EST LOW 20 MIN: CPT | Performed by: FAMILY MEDICINE

## 2020-01-08 PROCEDURE — G8510 SCR DEP NEG, NO PLAN REQD: HCPCS | Performed by: FAMILY MEDICINE

## 2020-01-08 RX ORDER — LEVOCETIRIZINE DIHYDROCHLORIDE 5 MG/1
5 TABLET, FILM COATED ORAL NIGHTLY
Qty: 30 TABLET | Refills: 5 | Status: SHIPPED | OUTPATIENT
Start: 2020-01-08 | End: 2020-08-10 | Stop reason: SDUPTHER

## 2020-01-08 ASSESSMENT — ENCOUNTER SYMPTOMS
DIARRHEA: 0
EYE DISCHARGE: 0
NAUSEA: 0
FACIAL SWELLING: 0
ABDOMINAL PAIN: 0
EYE REDNESS: 0
VOMITING: 0
EYE ITCHING: 1
COUGH: 1

## 2020-01-08 ASSESSMENT — PATIENT HEALTH QUESTIONNAIRE - PHQ9
1. LITTLE INTEREST OR PLEASURE IN DOING THINGS: 0
2. FEELING DOWN, DEPRESSED OR HOPELESS: 0
SUM OF ALL RESPONSES TO PHQ QUESTIONS 1-9: 0
SUM OF ALL RESPONSES TO PHQ9 QUESTIONS 1 & 2: 0
SUM OF ALL RESPONSES TO PHQ QUESTIONS 1-9: 0

## 2020-01-30 ENCOUNTER — OFFICE VISIT (OUTPATIENT)
Dept: PRIMARY CARE CLINIC | Age: 80
End: 2020-01-30
Payer: MEDICARE

## 2020-01-30 VITALS
WEIGHT: 152.7 LBS | OXYGEN SATURATION: 100 % | RESPIRATION RATE: 16 BRPM | DIASTOLIC BLOOD PRESSURE: 66 MMHG | SYSTOLIC BLOOD PRESSURE: 115 MMHG | TEMPERATURE: 97.6 F | HEART RATE: 58 BPM | BODY MASS INDEX: 22.55 KG/M2

## 2020-01-30 PROCEDURE — 99213 OFFICE O/P EST LOW 20 MIN: CPT | Performed by: NURSE PRACTITIONER

## 2020-01-30 RX ORDER — OFLOXACIN 3 MG/ML
5 SOLUTION AURICULAR (OTIC) 2 TIMES DAILY
Qty: 5 ML | Refills: 0 | Status: SHIPPED | OUTPATIENT
Start: 2020-01-30 | End: 2020-02-06

## 2020-01-30 ASSESSMENT — ENCOUNTER SYMPTOMS
RHINORRHEA: 0
VOMITING: 0
WHEEZING: 0
NAUSEA: 0
COUGH: 0
SHORTNESS OF BREATH: 0
SORE THROAT: 0
FACIAL SWELLING: 0
DIARRHEA: 0

## 2020-01-30 NOTE — PROGRESS NOTES
route daily 1 Bottle 0     No current facility-administered medications for this visit. Allergies   Allergen Reactions    Seasonal        Subjective:     Review of Systems   Constitutional: Negative for appetite change, chills, diaphoresis, fatigue and fever. HENT: Positive for ear pain. Negative for congestion, ear discharge, facial swelling, rhinorrhea and sore throat. Respiratory: Negative for cough, shortness of breath and wheezing. Gastrointestinal: Negative for diarrhea, nausea and vomiting. Skin: Negative for rash and wound. Neurological: Negative for dizziness, light-headedness and headaches. Objective:      Physical Exam  Vitals signs and nursing note reviewed. Constitutional:       General: He is not in acute distress. Appearance: Normal appearance. He is well-developed. He is not ill-appearing or diaphoretic. HENT:      Head: Normocephalic and atraumatic. Right Ear: Hearing, ear canal and external ear normal. No drainage. No middle ear effusion. No mastoid tenderness. Tympanic membrane is not injected, erythematous or bulging. Left Ear: Hearing, ear canal and external ear normal. Swelling (Minimal edema and erythema to the external auditory canal.) and tenderness (Tenderness with otoscope insertion. Small amount of white debris noted to external auditory canal.) present. No drainage. No middle ear effusion. No mastoid tenderness. Tympanic membrane is not injected, erythematous or bulging. Nose: Nose normal. No mucosal edema, congestion or rhinorrhea. Right Sinus: No maxillary sinus tenderness or frontal sinus tenderness. Left Sinus: No maxillary sinus tenderness or frontal sinus tenderness. Mouth/Throat:      Lips: Pink. Mouth: Mucous membranes are moist.      Pharynx: Oropharynx is clear. Uvula midline. Eyes:      Conjunctiva/sclera: Conjunctivae normal.      Pupils: Pupils are equal, round, and reactive to light.    Neck: Musculoskeletal: Neck supple. Cardiovascular:      Rate and Rhythm: Regular rhythm. Bradycardia present. Heart sounds: Normal heart sounds, S1 normal and S2 normal. No murmur. No friction rub. No gallop. Pulmonary:      Effort: Pulmonary effort is normal. No accessory muscle usage or respiratory distress. Breath sounds: Normal breath sounds and air entry. No decreased breath sounds, wheezing, rhonchi or rales. Comments: No cough. Breath sounds clear B/L anterior and posterior lobes. Chest expansion symmetrical.  No audible wheezing or respiratory distress. No rales or rhonchi. Lymphadenopathy:      Cervical: No cervical adenopathy. Right cervical: No superficial or posterior cervical adenopathy. Left cervical: No superficial or posterior cervical adenopathy. Skin:     General: Skin is warm and dry. Coloration: Skin is not pale. Findings: No erythema or rash. Neurological:      Mental Status: He is alert and oriented to person, place, and time. Psychiatric:         Behavior: Behavior normal. Behavior is cooperative. /66   Pulse 58   Temp 97.6 °F (36.4 °C)   Resp 16   Wt 152 lb 11.2 oz (69.3 kg)   SpO2 100%   BMI 22.55 kg/m²     Assessment:      Diagnosis Orders   1. Other infective acute otitis externa of left ear  ofloxacin (FLOXIN) 0.3 % otic solution       Plan:      Return if symptoms worsen or fail to improve, for Resume all previous medications as directed. Orders Placed This Encounter   Medications    ofloxacin (FLOXIN) 0.3 % otic solution     Sig: Place 5 drops into the left ear 2 times daily for 7 days     Dispense:  5 mL     Refill:  0      · Warm ear drops before instillation by rubbing in palms of hand for few minutes. · Ear Drops should be instilled while in side lying position with affected ear up.     · Gently pull ear lobe and move around to improve movement of drops into ear canal.  · Do not wear ear plugs or use hearing aids until pain has resolved. · Keep ears clean and dry, avoid getting water in the ears. · No swimming or submerging head in water for 7 to 10 days. · Ofloxacin 0.3% ear drops, 5 drops into affected ear twice per day for 7 days. · Tylenol or Ibuprofen OTC as directed on package for pain/discomfort. · Patient instructions given for acute otitis externa and ofloxacin. · To ER or call 911 if any difficulty breathing, shortness of breath, inability to swallow, hives, rash, facial/tongue swelling or temp greater than 103 degrees. · Follow up as needed with PCP or Walk in Care if symptoms worsen or do not improve       Juan received counseling on the following healthy behaviors: medication adherence. Patient given educational materials - see patient instructions. Discussed use,benefit, and side effects of prescribed medications. Treatment plan discussed at visit. Continue routine health care follow up. All patient questions answered. Pt voiced understanding.       Electronically signed by MARGARETH Jimenez CNP on 1/30/2020 at 1:58 PM

## 2020-01-30 NOTE — PATIENT INSTRUCTIONS
the drops to body temperature by rolling the container in your hands. Or you can place it in a cup of warm water for a few minutes. · Lie down, with your ear facing up. · Place drops inside the ear. Follow your doctor's instructions (or the directions on the label) for how many drops to use. Gently wiggle the outer ear or pull the ear up and back to help the drops get into the ear. · It's important to keep the liquid in the ear canal for 3 to 5 minutes. When should you call for help? Call your doctor now or seek immediate medical care if:    · You have a new or higher fever.     · You have new or worse pain, swelling, warmth, or redness around or behind your ear.     · You have new or increasing pus or blood draining from your ear.    Watch closely for changes in your health, and be sure to contact your doctor if:    · You are not getting better after 2 days (48 hours). Where can you learn more? Go to https://[employer]. org and sign in to your [employer] account. Enter C706 in the [employer] box to learn more about \"Swimmer's Ear: Care Instructions. \"     If you do not have an account, please click on the \"Sign Up Now\" link. Current as of: July 28, 2019  Content Version: 12.3  © 3001-3511 Healthwise, Incorporated. Care instructions adapted under license by [facility] ([facility]). If you have questions about a medical condition or this instruction, always ask your healthcare professional. [place] [address] any warranty or liability for your use of this information. Patient Education        ofloxacin otic  Pronunciation:  oh FLOCKS a sin OH tic  Brand:  Floxin Otic  What is the most important information I should know about ofloxacin otic? Follow all directions on your medicine label and package. Tell each of your healthcare providers about all your medical conditions, allergies, and all medicines you use. What is ofloxacin otic?   Ofloxacin is an antibiotic that facial/tongue swelling or temp greater than 103 degrees.   · Follow up as needed with PCP or Walk in Care if symptoms worsen or do not improve

## 2020-02-24 ENCOUNTER — OFFICE VISIT (OUTPATIENT)
Dept: FAMILY MEDICINE CLINIC | Age: 80
End: 2020-02-24
Payer: MEDICARE

## 2020-02-24 VITALS
WEIGHT: 152 LBS | TEMPERATURE: 97.7 F | BODY MASS INDEX: 22.45 KG/M2 | DIASTOLIC BLOOD PRESSURE: 80 MMHG | HEART RATE: 52 BPM | SYSTOLIC BLOOD PRESSURE: 130 MMHG | OXYGEN SATURATION: 96 %

## 2020-02-24 PROCEDURE — 99213 OFFICE O/P EST LOW 20 MIN: CPT | Performed by: FAMILY MEDICINE

## 2020-02-24 RX ORDER — FLUTICASONE PROPIONATE 50 MCG
2 SPRAY, SUSPENSION (ML) NASAL DAILY
Qty: 1 BOTTLE | Refills: 0 | Status: SHIPPED | OUTPATIENT
Start: 2020-02-24

## 2020-02-24 ASSESSMENT — ENCOUNTER SYMPTOMS
EYE DISCHARGE: 0
EYE REDNESS: 0
VOMITING: 0
SORE THROAT: 0
TROUBLE SWALLOWING: 0
RHINORRHEA: 0
FACIAL SWELLING: 0

## 2020-02-24 NOTE — PATIENT INSTRUCTIONS
SURVEY:    You may be receiving a survey from [employer] regarding your visit today. Please complete the survey to enable us to provide the highest quality of care to you and your family. If you cannot score us a very good on any question, please call the office to discuss how we could have made your experience a very good one. Thank you.

## 2020-02-24 NOTE — PROGRESS NOTES
requirements and ordered appropriate tests  There are no preventive care reminders to display for this patient. Past Surgical History:     Past Surgical History:   Procedure Laterality Date    COLONOSCOPY  2006    polyp    COLONOSCOPY  01/04/2016    sigmoid diverticula    INTRACAPSULAR CATARACT EXTRACTION      Bilat    TONSILLECTOMY          Medications:       Prior to Admission medications    Medication Sig Start Date End Date Taking? Authorizing Provider   fluticasone (FLONASE) 50 MCG/ACT nasal spray 2 sprays by Each Nostril route daily 2/24/20  Yes Hai Yoo MD   levocetirizine (XYZAL) 5 MG tablet Take 1 tablet by mouth nightly 1/8/20  Yes Hai Yoo MD   ezetimibe (ZETIA) 10 MG tablet Take 1 tablet by mouth daily 12/11/19  Yes Hai Yoo MD   calcium carbonate (OSCAL) 500 MG TABS tablet Take 500 mg by mouth daily Two tablet a day   Yes Historical Provider, MD        Allergies:       Seasonal    Social History:     Tobacco:    reports that he quit smoking about 50 years ago. His smoking use included cigarettes. He has a 2.50 pack-year smoking history. He has never used smokeless tobacco.  Alcohol:      reports current alcohol use of about 1.0 standard drinks of alcohol per week. Drug Use:  reports no history of drug use. Family History:     Family History   Problem Relation Age of Onset   [address] [facility] Parkinsonism Mother     Heart Disease Father     Crohn's Disease Sister        Review of Systems:       Review of Systems   Constitutional: Negative for chills and fever. HENT: Positive for ear pain. Negative for ear discharge, facial swelling, rhinorrhea, sore throat and trouble swallowing. Eyes: Negative for discharge and redness. Gastrointestinal: Negative for vomiting. Skin: Negative for rash. Neurological: Negative for dizziness, facial asymmetry and headaches. Physical Exam:     Physical Exam  Vitals signs reviewed. Constitutional:       Appearance: Normal appearance. HENT:      Right Ear: Tympanic membrane, ear canal and external ear normal. There is no impacted cerumen. Left Ear: Ear canal and external ear normal. A middle ear effusion is present. There is no impacted cerumen. Ears:      Comments: Lt TM minimal bulging and dull, no erythema      Nose: No congestion or rhinorrhea. Eyes:      General:         Right eye: No discharge. Left eye: No discharge. Neck:      Musculoskeletal: Neck supple. No muscular tenderness. Lymphadenopathy:      Cervical: No cervical adenopathy. Neurological:      Mental Status: He is alert. Vitals:  /80   Pulse 52   Temp 97.7 °F (36.5 °C)   Wt 152 lb (68.9 kg)   SpO2 96%   BMI 22.45 kg/m²       Data:     Lab Results   Component Value Date     11/21/2019    K 4.2 11/21/2019    CL 99 11/21/2019    CO2 21 11/21/2019    BUN 16 11/21/2019    CREATININE 1.14 11/21/2019    GLUCOSE 118 11/21/2019    LABALBU 3.9 07/03/2012    BILITOT 1.05 07/03/2012    ALKPHOS 59 07/03/2012    AST 24 11/30/2017    ALT 17 11/30/2017     Lab Results   Component Value Date    WBC 5.0 11/30/2017    RBC 5.07 11/30/2017    HGB 15.6 11/30/2017    HCT 46.4 11/30/2017    MCV 91.5 11/30/2017    MCH 30.7 11/30/2017    MCHC 33.5 11/30/2017    RDW 12.5 01/13/2014     11/30/2017    MPV NOT REPORTED 01/13/2014     Lab Results   Component Value Date    TSH 2.99 11/21/2019     Lab Results   Component Value Date    CHOL 233 11/21/2019    HDL 50 11/21/2019    PSA 3.68 11/21/2019    LABA1C 6.0 11/21/2019          Assessment/Plan:        1. Eustachian tube dysfunction, left  Pt used to take flonase but has stopped fo awhile now. Pt encouraged to go back on the flonase daily if helping for 2-4wks and may even add for 1-2 weeks claritin or zyrtec. F/u if not better or worsening symptoms       Return if symptoms worsen or fail to improve.       Electronically signed by Estephania Bermudez MD on 2/24/2020 at 10:23 AM

## 2020-03-12 ENCOUNTER — HOSPITAL ENCOUNTER (EMERGENCY)
Age: 80
Discharge: HOME | End: 2020-03-12
Attending: EMERGENCY MEDICINE
Payer: MEDICARE

## 2020-03-12 ENCOUNTER — OFFICE VISIT (OUTPATIENT)
Dept: PRIMARY CARE CLINIC | Age: 80
End: 2020-03-12
Payer: MEDICARE

## 2020-03-12 VITALS
DIASTOLIC BLOOD PRESSURE: 71 MMHG | RESPIRATION RATE: 20 BRPM | HEIGHT: 69 IN | HEART RATE: 70 BPM | OXYGEN SATURATION: 100 % | BODY MASS INDEX: 23.11 KG/M2 | WEIGHT: 156 LBS | TEMPERATURE: 98 F | SYSTOLIC BLOOD PRESSURE: 135 MMHG

## 2020-03-12 VITALS
TEMPERATURE: 98 F | OXYGEN SATURATION: 95 % | BODY MASS INDEX: 23.04 KG/M2 | WEIGHT: 156 LBS | SYSTOLIC BLOOD PRESSURE: 134 MMHG | DIASTOLIC BLOOD PRESSURE: 78 MMHG | HEART RATE: 56 BPM

## 2020-03-12 LAB
INFLUENZA A ANTIBODY: NEGATIVE
INFLUENZA B ANTIBODY: NEGATIVE

## 2020-03-12 PROCEDURE — 87804 INFLUENZA ASSAY W/OPTIC: CPT | Performed by: NURSE PRACTITIONER

## 2020-03-12 PROCEDURE — 6370000000 HC RX 637 (ALT 250 FOR IP): Performed by: EMERGENCY MEDICINE

## 2020-03-12 PROCEDURE — 99283 EMERGENCY DEPT VISIT LOW MDM: CPT

## 2020-03-12 RX ORDER — IBUPROFEN 200 MG
600 TABLET ORAL ONCE
Status: COMPLETED | OUTPATIENT
Start: 2020-03-12 | End: 2020-03-12

## 2020-03-12 RX ADMIN — IBUPROFEN 600 MG: 200 TABLET, FILM COATED ORAL at 13:04

## 2020-03-12 ASSESSMENT — ENCOUNTER SYMPTOMS
COLOR CHANGE: 0
NAUSEA: 0
COUGH: 0
VOMITING: 0
BACK PAIN: 0
TROUBLE SWALLOWING: 0
DIARRHEA: 0
EYE REDNESS: 0
SHORTNESS OF BREATH: 0
ABDOMINAL PAIN: 0
EYE PAIN: 0
SORE THROAT: 0

## 2020-03-12 ASSESSMENT — PAIN SCALES - GENERAL
PAINLEVEL_OUTOF10: 0
PAINLEVEL_OUTOF10: 0

## 2020-03-12 NOTE — ED PROVIDER NOTES
Social History Narrative    Not on file           PHYSICAL EXAM    (up to 7 for level 4, 8 ormore for level 5)     ED Triage Vitals [03/12/20 1249]   BP Temp Temp src Pulse Resp SpO2 Height Weight   (!) 166/77 98 °F (36.7 °C) -- 70 20 100 % 5' 9\" (1.753 m) 156 lb (70.8 kg)       Physical Exam     Physical    Vital signs and nursing notes were reviewed as well as the social, family, and past medical history. Gen. appearance: Patient is alert and oriented and in no acute distress    Head: Atraumatic, normocephalic    Neck: Supple, trachea/thyroid normal    EENT: PERRLA, EOMI, conjunctiva normal.    Skin: Warm and dry with no rash    Cardiovascular: Heart RRR, no gallops or rubs, no aortic enlargement or bruits noted. Respiratory: Lungs clear, no wheezing, no rales, normal breath sounds. Gastrointestinal: Abdomen nontender, bowel sounds normal, no rebound/guarding/distention or mass    Musculoskeletal: No tenderness in the extremities, no back or hip pain. Neurological: Patient is alert and oriented ×3, no focal motor or sensory deficits noted      DIAGNOSTIC RESULTS             LABS:  Labs Reviewed - No data to display    All other labs were within normal range or not returned as of this dictation. EMERGENCY DEPARTMENT COURSE and DIFFERENTIAL DIAGNOSIS/MDM:   Vitals:    Vitals:    03/12/20 1321 03/12/20 1336 03/12/20 1351 03/12/20 1406   BP: (!) 147/65 131/62 (!) 142/60 (!) 144/66   Pulse:       Resp:       Temp:       SpO2: 97% 97% 97% 98%   Weight:       Height:                     REASSESSMENT      In the ED patient is asymptomatic with a pulse ox of 98% and wants to go home and feels good. His influenza screen from the walk-in clinic was negative and patient will be discharged home. Is advised to self quarantine avoid exposing other people to what ever virus he has at this time. PROCEDURES:  Unless otherwise noted below, none     Procedures    FINAL IMPRESSION      1.  Viral URI

## 2020-03-12 NOTE — DISCHARGE INSTR - COC
Continuity of Care Form    Patient Name: Caleb Bobo   :  1940  MRN:  [id number]    Admit date:  3/12/2020  Discharge date:  ***    Code Status Order: Prior   Advance Directives:     Admitting Physician:  No admitting provider for patient encounter.   PCP: Adam Miranda MD    Discharging Nurse: [facility] Unit/Room#:   Discharging Unit Phone Number: ***    Emergency Contact:   Extended Emergency Contact Information  Primary Emergency Contact: Moisés August  Address: [address] [place], [place] [address] [place] [address] Phone: [phone]  Relation: Spouse    Past Surgical History:  Past Surgical History:   Procedure Laterality Date    COLONOSCOPY  2006    polyp    COLONOSCOPY  2016    sigmoid diverticula    INTRACAPSULAR CATARACT EXTRACTION      Bilat    TONSILLECTOMY         Immunization History:   Immunization History   Administered Date(s) Administered    Hepatitis A 2008, 10/09/2008    Hepatitis A Ped/Adol (Havrix, Vaqta) 2008, 10/09/2008    Hepatitis B 2009, 10/09/2009, 2010    Hepatitis B Ped/Adol (Engerix-B, Recombivax HB) 2009, 10/09/2009, 2010    Influenza 2011, 10/18/2012, 10/23/2013    Influenza Vaccine, unspecified formulation 10/23/2013    Influenza Virus Vaccine 10/15/2014, 10/23/2015    Influenza, Quadv, IM, PF (6 mo and older Fluzone, Flulaval, Fluarix, and 3 yrs and older Afluria) 2016, 10/11/2017, 10/18/2018, 10/09/2019    MMR 09/15/2009    Meningococcal MCV4P (Menactra) 2009    PPD Test 2014    Pneumococcal Conjugate 13-valent (Kalnric22) 2016    Pneumococcal Polysaccharide ([id number]) 09/15/2009    Polio IPV (IPOL) 2009    Tdap (Boostrix, Adacel) 2009, 2019    Yellow Fever (YF-Vax) 10/13/2009    Zoster Live (Zostavax) 2011    Zoster Recombinant (Shingrix) 2019, 2019       Active Problems:  Patient Active Problem List XGIVHGZ:402634706}  Last Modified Barium Swallow with Video (Video Swallowing Test): {Done Not Done EWXF:600361066}    Treatments at the Time of Hospital Discharge:   Respiratory Treatments: ***  Oxygen Therapy:  {Therapy; copd oxygen:03073}  Ventilator:    { CC Vent BKMM:901188995}    Rehab Therapies: {THERAPEUTIC INTERVENTION:8156745699}  Weight Bearing Status/Restrictions: {[facility] Weight Bearin}  Other Medical Equipment (for information only, NOT a DME order):  {EQUIPMENT:200560565}  Other Treatments: ***    Patient's personal belongings (please select all that are sent with patient):  {P DME Belongings:597054107}    RN SIGNATURE:  {Esignature:687320416}    CASE MANAGEMENT/SOCIAL WORK SECTION    Inpatient Status Date: ***    Readmission Risk Assessment Score:  Readmission Risk              Risk of Unplanned Readmission:        0           Discharging to Facility/ Agency   · Name:   · Address:  · Phone:  · Fax:    Dialysis Facility (if applicable)   · Name:  · Address:  · Dialysis Schedule:  · Phone:  · Fax:    / signature: {Esignature:592574344}    PHYSICIAN SECTION    Prognosis: {Prognosis:3791798838}    Condition at Discharge: [address] [place] [address] Patient Condition:908998207}    Rehab Potential (if transferring to Rehab): {Prognosis:4128326137}    Recommended Labs or Other Treatments After Discharge: ***    Physician Certification: I certify the above information and transfer of Rebecca Ramirez  is necessary for the continuing treatment of the diagnosis listed and that he requires {Admit to Appropriate Level of Care:16730} for {GREATER/LESS:587793420} 30 days.      Update Admission H&P: {CHP DME Changes in UYSPS:524449110}    PHYSICIAN SIGNATURE:  {Esignature:339751139}

## 2020-03-12 NOTE — PATIENT INSTRUCTIONS
SURVEY:    You may be receiving a survey from [employer] regarding your visit today. Please complete the survey to enable us to provide the highest quality of care to you and your family. If you cannot score us a very good on any question, please call the office to discuss how we could of made your experience a very good one. Thank you.

## 2020-03-13 ENCOUNTER — TELEPHONE (OUTPATIENT)
Dept: FAMILY MEDICINE CLINIC | Age: 80
End: 2020-03-13

## 2020-08-10 RX ORDER — LEVOCETIRIZINE DIHYDROCHLORIDE 5 MG/1
5 TABLET, FILM COATED ORAL NIGHTLY
Qty: 30 TABLET | Refills: 5 | Status: SHIPPED | OUTPATIENT
Start: 2020-08-10 | End: 2021-01-19 | Stop reason: SDUPTHER

## 2020-08-10 NOTE — TELEPHONE ENCOUNTER
xyzal 5 mg    [facility] Maintenance   Topic Date Due    Flu vaccine (1) 09/01/2020    Annual Wellness Visit (AWV)  12/11/2020    DTaP/Tdap/Td vaccine (3 - Td) 12/11/2029    Shingles Vaccine  Completed    Pneumococcal 65+ years Vaccine  Completed    Hepatitis A vaccine  Aged Out    Hepatitis B vaccine  Aged Out    Hib vaccine  Aged Out    Meningococcal (ACWY) vaccine  Aged Out             (applicable per patient's age: Cancer Screenings, Depression Screening, Fall Risk Screening, Immunizations)    Hemoglobin A1C (%)   Date Value   11/21/2019 6.0   11/01/2018 5.4   11/30/2017 5.6     LDL Cholesterol (mg/dL)   Date Value   07/03/2012 121     LDL Calculated (mg/dL)   Date Value   11/21/2019 158     AST (U/L)   Date Value   11/30/2017 24     ALT (U/L)   Date Value   11/30/2017 17     BUN (mg/dL)   Date Value   11/21/2019 16      (goal A1C is < 7)   (goal LDL is <100) need 30-50% reduction from baseline     BP Readings from Last 3 Encounters:   03/12/20 135/71   03/12/20 134/78   02/24/20 130/80    (goal /80)      All Future Testing planned in [facility]:      Next Visit Date:  No future appointments.          Patient Active Problem List:     Pure hypercholesterolemia     Esophageal reflux     Other abnormal glucose     Hypertrophy of prostate without urinary obstruction and other lower urinary tract symptoms (LUTS)     History of colon polyps     Epididymal cyst     Scrotal pain     Seasonal allergies     Other hemorrhoids

## 2020-12-28 ENCOUNTER — TELEPHONE (OUTPATIENT)
Dept: FAMILY MEDICINE CLINIC | Age: 80
End: 2020-12-28

## 2020-12-28 RX ORDER — EZETIMIBE 10 MG/1
10 TABLET ORAL DAILY
Qty: 90 TABLET | Refills: 1 | Status: SHIPPED | OUTPATIENT
Start: 2020-12-28 | End: 2021-07-09 | Stop reason: SDUPTHER

## 2020-12-28 NOTE — TELEPHONE ENCOUNTER
Patient needs script for Zetia - patient uses [facility]    Health Maintenance   Topic Date Due    Hepatitis C screen  1940    Annual Wellness Visit (AWV)  06/20/2019    DTaP/Tdap/Td vaccine (3 - Td) 12/11/2029    Flu vaccine  Completed    Shingles Vaccine  Completed    Pneumococcal 65+ years Vaccine  Completed    Hepatitis A vaccine  Aged Out    Hepatitis B vaccine  Aged Out    Hib vaccine  Aged Out    Meningococcal (ACWY) vaccine  Aged Out             (applicable per patient's age: Cancer Screenings, Depression Screening, Fall Risk Screening, Immunizations)    Hemoglobin A1C (%)   Date Value   11/21/2019 6.0   11/01/2018 5.4   11/30/2017 5.6     LDL Cholesterol (mg/dL)   Date Value   07/03/2012 121     LDL Calculated (mg/dL)   Date Value   11/21/2019 158     AST (U/L)   Date Value   11/30/2017 24     ALT (U/L)   Date Value   11/30/2017 17     BUN (mg/dL)   Date Value   11/21/2019 16      (goal A1C is < 7)   (goal LDL is <100) need 30-50% reduction from baseline     BP Readings from Last 3 Encounters:   03/12/20 135/71   03/12/20 134/78   02/24/20 130/80    (goal /80)      All Future Testing planned in [facility]:      Next Visit Date:  Future Appointments   Date Time Provider Rashmi Montemayor   1/19/2021  9:00 AM [facility]            Patient Active Problem List:     Pure hypercholesterolemia     Esophageal reflux     Other abnormal glucose     Hypertrophy of prostate without urinary obstruction and other lower urinary tract symptoms (LUTS)     History of colon polyps     Epididymal cyst     Scrotal pain     Seasonal allergies     Other hemorrhoids

## 2021-01-13 DIAGNOSIS — Z23 NEED FOR VACCINATION: ICD-10-CM

## 2021-01-19 ENCOUNTER — OFFICE VISIT (OUTPATIENT)
Dept: FAMILY MEDICINE CLINIC | Age: 81
End: 2021-01-19
Payer: MEDICARE

## 2021-01-19 ENCOUNTER — HOSPITAL ENCOUNTER (OUTPATIENT)
Age: 81
Discharge: HOME | End: 2021-01-19
Payer: MEDICARE

## 2021-01-19 VITALS
DIASTOLIC BLOOD PRESSURE: 70 MMHG | HEIGHT: 70 IN | OXYGEN SATURATION: 98 % | HEART RATE: 64 BPM | WEIGHT: 148 LBS | BODY MASS INDEX: 21.19 KG/M2 | SYSTOLIC BLOOD PRESSURE: 118 MMHG

## 2021-01-19 DIAGNOSIS — Z12.5 PROSTATE CANCER SCREENING: ICD-10-CM

## 2021-01-19 DIAGNOSIS — E78.00 PURE HYPERCHOLESTEROLEMIA: ICD-10-CM

## 2021-01-19 DIAGNOSIS — Z00.00 MEDICARE ANNUAL WELLNESS VISIT, SUBSEQUENT: Primary | ICD-10-CM

## 2021-01-19 DIAGNOSIS — J30.2 SEASONAL ALLERGIES: ICD-10-CM

## 2021-01-19 DIAGNOSIS — R73.9 HYPERGLYCEMIA: ICD-10-CM

## 2021-01-19 LAB
ANION GAP SERPL CALCULATED.3IONS-SCNC: 9 MMOL/L (ref 9–17)
BUN BLDV-MCNC: 13 MG/DL (ref 8–23)
BUN/CREAT BLD: 12 (ref 9–20)
CALCIUM SERPL-MCNC: 9.9 MG/DL (ref 8.6–10.4)
CHLORIDE BLD-SCNC: 100 MMOL/L (ref 98–107)
CHOLESTEROL/HDL RATIO: 3.8
CHOLESTEROL: 184 MG/DL
CO2: 27 MMOL/L (ref 20–31)
CREAT SERPL-MCNC: 1.08 MG/DL (ref 0.7–1.2)
ESTIMATED AVERAGE GLUCOSE: 114 MG/DL
GFR AFRICAN AMERICAN: >60 ML/MIN
GFR NON-AFRICAN AMERICAN: >60 ML/MIN
GFR SERPL CREATININE-BSD FRML MDRD: ABNORMAL ML/MIN/{1.73_M2}
GFR SERPL CREATININE-BSD FRML MDRD: ABNORMAL ML/MIN/{1.73_M2}
GLUCOSE BLD-MCNC: 100 MG/DL (ref 70–99)
HBA1C MFR BLD: 5.6 % (ref 4–6)
HDLC SERPL-MCNC: 49 MG/DL
LDL CHOLESTEROL: 115 MG/DL (ref 0–130)
PATIENT FASTING?: YES
POTASSIUM SERPL-SCNC: 4.4 MMOL/L (ref 3.7–5.3)
PROSTATE SPECIFIC ANTIGEN: 2.89 UG/L
SODIUM BLD-SCNC: 136 MMOL/L (ref 135–144)
TRIGL SERPL-MCNC: 101 MG/DL
VLDLC SERPL CALC-MCNC: NORMAL MG/DL (ref 1–30)

## 2021-01-19 PROCEDURE — 36415 COLL VENOUS BLD VENIPUNCTURE: CPT

## 2021-01-19 PROCEDURE — G0439 PPPS, SUBSEQ VISIT: HCPCS | Performed by: FAMILY MEDICINE

## 2021-01-19 PROCEDURE — 80061 LIPID PANEL: CPT

## 2021-01-19 PROCEDURE — G0103 PSA SCREENING: HCPCS

## 2021-01-19 PROCEDURE — 83036 HEMOGLOBIN GLYCOSYLATED A1C: CPT

## 2021-01-19 PROCEDURE — 80048 BASIC METABOLIC PNL TOTAL CA: CPT

## 2021-01-19 RX ORDER — LEVOCETIRIZINE DIHYDROCHLORIDE 5 MG/1
5 TABLET, FILM COATED ORAL NIGHTLY
Qty: 90 TABLET | Refills: 3 | Status: SHIPPED | OUTPATIENT
Start: 2021-01-19 | End: 2022-04-11 | Stop reason: SDUPTHER

## 2021-01-19 ASSESSMENT — PATIENT HEALTH QUESTIONNAIRE - PHQ9
SUM OF ALL RESPONSES TO PHQ QUESTIONS 1-9: 0
SUM OF ALL RESPONSES TO PHQ QUESTIONS 1-9: 0
2. FEELING DOWN, DEPRESSED OR HOPELESS: 0
1. LITTLE INTEREST OR PLEASURE IN DOING THINGS: 0

## 2021-01-19 ASSESSMENT — LIFESTYLE VARIABLES
HOW OFTEN DO YOU HAVE SIX OR MORE DRINKS ON ONE OCCASION: 0
HOW OFTEN DO YOU HAVE A DRINK CONTAINING ALCOHOL: 1
HAVE YOU OR SOMEONE ELSE BEEN INJURED AS A RESULT OF YOUR DRINKING: 0
HAS A RELATIVE, FRIEND, DOCTOR, OR ANOTHER HEALTH PROFESSIONAL EXPRESSED CONCERN ABOUT YOUR DRINKING OR SUGGESTED YOU CUT DOWN: 0
AUDIT TOTAL SCORE: 1
HOW OFTEN DURING THE LAST YEAR HAVE YOU NEEDED AN ALCOHOLIC DRINK FIRST THING IN THE MORNING TO GET YOURSELF GOING AFTER A NIGHT OF HEAVY DRINKING: 0

## 2021-01-19 NOTE — PROGRESS NOTES
Medicare Annual Wellness Visit       Efraín Home  YOB: 1940    Date of Service:  1/19/2021    Patient Active Problem List   Diagnosis    Pure hypercholesterolemia    Esophageal reflux    Other abnormal glucose    Hypertrophy of prostate without urinary obstruction and other lower urinary tract symptoms (LUTS)    History of colon polyps    Epididymal cyst    Scrotal pain    Seasonal allergies    Other hemorrhoids       Allergies   Allergen Reactions    Seasonal      Outpatient Medications Marked as Taking for the 1/19/21 encounter (Office Visit) with Arpan Mae MD   Medication Sig Dispense Refill    ezetimibe (ZETIA) 10 MG tablet Take 1 tablet by mouth daily 90 tablet 1    levocetirizine (XYZAL) 5 MG tablet Take 1 tablet by mouth nightly 30 tablet 5    calcium carbonate (OSCAL) 500 MG TABS tablet Take 500 mg by mouth daily Two tablet a day         Past Medical History:   Diagnosis Date    GERD (gastroesophageal reflux disease)     Hyperlipidemia      Past Surgical History:   Procedure Laterality Date    COLONOSCOPY  2006    polyp    COLONOSCOPY  01/04/2016    sigmoid diverticula    INTRACAPSULAR CATARACT EXTRACTION      Bilat    TONSILLECTOMY       Family History   Problem Relation Age of Onset    Parkinsonism Mother     Heart Disease Father     Crohn's Disease Sister      Social History     Socioeconomic History    Marital status:      Spouse name: Not on file    Number of children: Not on file    Years of education: Not on file    Highest education level: Not on file   Occupational History    Not on file   Social Needs    Financial resource strain: Not on file    Food insecurity     Worry: Not on file     Inability: Not on file    Transportation needs     Medical: Not on file     Non-medical: Not on file   Tobacco Use    Smoking status: Former Smoker     Packs/day: 0.25     Years: 10.00     Pack years: 2.50     Types: Cigarettes     Quit date: 1/4/1970 Years since quittin.0    Smokeless tobacco: Never Used   Substance and Sexual Activity    Alcohol use: Yes     Alcohol/week: 1.0 standard drinks     Types: 1 Glasses of wine per week     Comment: rare    Drug use: No    Sexual activity: Not on file   Lifestyle    Physical activity     Days per week: Not on file     Minutes per session: Not on file    Stress: Not on file   Relationships    Social connections     Talks on phone: Not on file     Gets together: Not on file     Attends Congregational service: Not on file     Active member of club or organization: Not on file     Attends meetings of clubs or organizations: Not on file     Relationship status: Not on file    Intimate partner violence     Fear of current or ex partner: Not on file     Emotionally abused: Not on file     Physically abused: Not on file     Forced sexual activity: Not on file   Other Topics Concern    Not on file   Social History Narrative    Not on file       Consultants:   Patient Care Team:  Inga Dickinson MD as PCP - General (Family Medicine)  Inga Dickinson MD [facility]    Wt Readings from Last 3 Encounters:   21 148 lb (67.1 kg)   20 156 lb (70.8 kg)   20 156 lb (70.8 kg)     BP Readings from Last 3 Encounters:   21 118/70   20 135/71   20 134/78       Pertinent Physical Exam    Vitals:    21 0903   BP: 118/70   Pulse: 64   SpO2: 98%   Weight: 148 lb (67.1 kg)   Height: 5' 10\" (1.778 m)     Body mass index is 21.24 kg/m².      ROS (information related to health maintenance and screening)  10 systems reviewed and all WNL except seasonal allergies     Physical Exam (Minimal exam needed for wellness visit)  General Appearance: alert and oriented to person, place and time, well-developed and well-nourished, in no acute distress  Skin: warm and dry, no rash or erythema  Head: normocephalic and atraumatic  Eyes: pupils equal, conjunctivae normal  ENT: bilateral tympanic membrane normal, bilateral external ear normal and bilateral ear canal normal  Neck: neck supple and non tender without mass, no thyromegaly, no cervical lymphadenopathy   Pulmonary/Chest: clear to auscultation bilaterally - no wheezes, rales or rhonchi, normal air movement  Cardiovascular: normal rate, regular rhythm, no murmurs and no carotid bruits  Abdomen: soft, non-tender, non-distended, normal bowels sounds, and no masses  Extremities: no erythema, swelling or tenderness of extremities  Musculoskeletal: normal range of motion, no joint swelling or tenderness  Neurologic: no cranial nerve deficit, gait and coordination normal, speech normal and no tremor      Current Health Maintenance Status  Health Maintenance   Topic Date Due    Hepatitis C screen  1940    Annual Wellness Visit (AWV)  06/20/2019    DTaP/Tdap/Td vaccine (3 - Td) 12/11/2029    Flu vaccine  Completed    Shingles Vaccine  Completed    Pneumococcal 65+ years Vaccine  Completed    Hepatitis A vaccine  Aged Out    Hepatitis B vaccine  Aged Out    Hib vaccine  Aged Out    Meningococcal (ACWY) vaccine  Aged [employer] AWV Workflow and Risk Assessment    Review Medicare Health Risk Assessment form completed by patient  Document vitals, height included (3 vital signs minimum)      Update Allergies and Medications    complete  Update Family and Social History (HRA #2-6)    complete  Update Health Maintenance (HRA #7-13)    complete  Update Vaccines in Historical Immunizations (HRA #9-10)    complete  Update Patient Care Team- in [place] ([place] #14)     complete  Has patient seen an eye specialist within the past 2 years (HRA #7)? yes     Has patient seen a dentist within the past year (HRA #11)? yes    Medical Suppliers Used (diabetic supplies, 02, medical equipment, etc.) (HRA #14):  none    End of Life Planning (HRA # 15):   Advanced Directive:  yes,   copy on file      [facility] 652 form completed by patient, reviewed and scanned into chart. HRA interpretation guide- enter risks identified through screenings into table below    1. Behavioral Risks:    Tobacco:  If patient responded \"yes\" to HRA question #6, screen is positive. Result - negative  Alcohol use: Add up scores of alcohol use questions (HRA # 3-5)- positive result is 3 or above for women and 4 or above for men. Result - negative  Physical activity:  If patient responded \"no\" to HRA question #16, screen is positive. Result - negative  Nutrition:  Body mass index is 21.24 kg/m². [employer] If patient responded \"yes\" to HRA question #17, or BMI <18 or >30, screen is positive. Result - negative    BEHAVIORAL RISKS IDENTIFIED:   None identified      2. Psychosocial Risks:  Anxiety:  Add up scores of anxiety questions (HRA #18-19)- positive result is 3 or above, or if patient has current or past diagnosis of anxiety. Result - negative  Depression:  Add up scores of PHQ-2 (HRA #20-21): positive result is 3 or above, or if patient has current or past diagnosis of depression. Result - negative  Social/Emotional support:  If patient responded \"sometimes,\" or \"rarely/never\" to HRA question #22, screen is positive. Result - negative  Pain:  If patient responded \"a lot\" to HRA question #24, screen is positive. Result - negative    PSYCHOSOCIAL RISKS IDENTIFIED:   None identified      3. Functional/Safety Risks:    Memory:  Mini-Cognitive Exam   A. Mini-Cog Screen:  Give patient the following words to remember and ask to repeat- advise patient that he will be asked to recall items later:  Whitney RAMIREZ [address], [address]. Clock Drawing Test (CDT): Have patient draw the face of a clock and put the numbers in the correct positions. Then draw in the hands at ten minutes after eleven.   Score clock drawing test as \"normal\" if the patient places the correct time and the clock appears grossly normal, or \"abnormal.\"    CDT: Normal      C. The patient is then asked to recall the three words. (This should be done after one minute, or after the Clock Drawing Test is complete)(if remembers all 3 or none of the words, then do not need to perform CDT)   Mini-Cog Scorin/3 words recalled       MINI-COG INTERPRETATION: 1 or 2 with normal CDT- Negative for cognitive impairment     Living situation:  If patient responded \"lives alone\" to HRA question #23, screen is positive. Result - negative  Hearing: If patient responded \"yes\" to HRA question #25, screen is positive. Result - negative  Safety:  If patient responded \"no\" to any of the HRA questions #26-31, screen is positive. Result - negative  ADLs:  If patient responded \"yes\" to HRA questions #32 or #33, screen is positive. Result - negative  Fall risk:  Per MA note, If timed Get Up & Go test unsteady or longer than 20 seconds, or falls reported per HRA question #34, screen is positive. Complete outpatient fall risk assessment in document flow sheet Result - negative     Vision: (corrected vision)    Left eye  Right eye  Both eyes      20/    20/    20/       SAFETY RISKS IDENTIFIED:   None identified      Scan HRA form into media section of chart. Perform Vision Screening at Visit not performed      Personalized Preventive Plan   This plan is provided to the patient in written form.        Preventive plan of care for Orville Mock        2021           Preventive Measures Status       Recommendations for screening   Prostate Cancer Screen  Lab Results   Component Value Date    PSA 3.68 2019     Screening covered annually  This test is not clinically indicated    Colon Cancer Screen  Last colonoscopy: 16 Screening covered every 10 years  Repeat screening is not clinically indicated   Diabetes Screen  Glucose (mg/dL)   Date Value   2019 118    Screening covered annually, every 6 months if pre-diabetic  Repeat yearly   Cholesterol Screen  Lab Results   Component Treatment options   Behavioral Risks  · None identified   · No treatment is necessary   Psychosocial Risks  · None identified   · No treatment is necessary    Functional/Safety Risks  · None identified   · No treatment is necessary     Other Recommendations/Plans ·   needs labs but otherwise UTD                Visit Diagnosis   Diagnosis Orders   1. Medicare annual wellness visit, subsequent     2. Pure hypercholesterolemia  Lipid Panel    Basic Metabolic Panel   3. Seasonal allergies     4. Hyperglycemia  Hemoglobin A1C   5.  Prostate cancer screening  Psa screening

## 2021-01-24 NOTE — PROGRESS NOTES
Eyes:      Conjunctiva/sclera: Conjunctivae normal.   Cardiovascular:      Rate and Rhythm: Normal rate. Heart sounds: Normal heart sounds. Pulmonary:      Effort: Pulmonary effort is normal. No respiratory distress. Breath sounds: Normal breath sounds. No rhonchi. Neurological:      Mental Status: He is alert. Vitals:  /70   Pulse 54   Wt 152 lb (68.9 kg)   SpO2 98%   BMI 22.45 kg/m²       Data:     Lab Results   Component Value Date     11/21/2019    K 4.2 11/21/2019    CL 99 11/21/2019    CO2 21 11/21/2019    BUN 16 11/21/2019    CREATININE 1.14 11/21/2019    GLUCOSE 118 11/21/2019    LABALBU 3.9 07/03/2012    BILITOT 1.05 07/03/2012    ALKPHOS 59 07/03/2012    AST 24 11/30/2017    ALT 17 11/30/2017     Lab Results   Component Value Date    WBC 5.0 11/30/2017    RBC 5.07 11/30/2017    HGB 15.6 11/30/2017    HCT 46.4 11/30/2017    MCV 91.5 11/30/2017    MCH 30.7 11/30/2017    MCHC 33.5 11/30/2017    RDW 12.5 01/13/2014     11/30/2017    MPV NOT REPORTED 01/13/2014     Lab Results   Component Value Date    TSH 2.99 11/21/2019     Lab Results   Component Value Date    CHOL 233 11/21/2019    HDL 50 11/21/2019    PSA 3.68 11/21/2019    LABA1C 6.0 11/21/2019          Assessment/Plan:        1. Seasonal allergies  Stable and pt will stop the singulair and take the xyzal and flonase as needed for flare ups with the weather. Return in about 1 year (around 1/8/2021), or if symptoms worsen or fail to improve.       Electronically signed by Hai Yoo MD on 1/8/2020 at 9:38 AM
patient

## 2021-07-09 ENCOUNTER — TELEPHONE (OUTPATIENT)
Dept: FAMILY MEDICINE CLINIC | Age: 81
End: 2021-07-09

## 2021-07-09 DIAGNOSIS — E78.00 PURE HYPERCHOLESTEROLEMIA: ICD-10-CM

## 2021-07-09 RX ORDER — EZETIMIBE 10 MG/1
10 TABLET ORAL DAILY
Qty: 90 TABLET | Refills: 1 | Status: SHIPPED | OUTPATIENT
Start: 2021-07-09 | End: 2022-01-03 | Stop reason: SDUPTHER

## 2021-07-09 NOTE — TELEPHONE ENCOUNTER
Last OV: 1/19/2021 AWV  Last RX:  Next scheduled apt: Visit date not found        Sure scripts request      RX pending

## 2021-07-09 NOTE — TELEPHONE ENCOUNTER
Patient is asking for a refill on Zetia 10 mg to [facility]. Patient last seen 1/19/21. No future appt found. Health Maintenance   Topic Date Due    Hepatitis C screen  Never done    COVID-19 Vaccine (1) Never done    Flu vaccine (1) 09/01/2021    Annual Wellness Visit (AWV)  01/20/2022    DTaP/Tdap/Td vaccine (3 - Td or Tdap) 12/11/2029    Shingles Vaccine  Completed    Pneumococcal 65+ years Vaccine  Completed    Hepatitis A vaccine  Aged Out    Hepatitis B vaccine  Aged Out    Hib vaccine  Aged Out    Meningococcal (ACWY) vaccine  Aged Out             (applicable per patient's age: Cancer Screenings, Depression Screening, Fall Risk Screening, Immunizations)    Hemoglobin A1C (%)   Date Value   01/19/2021 5.6   11/21/2019 6.0   11/01/2018 5.4     LDL Cholesterol (mg/dL)   Date Value   01/19/2021 115     LDL Calculated (mg/dL)   Date Value   11/21/2019 158     AST (U/L)   Date Value   11/30/2017 24     ALT (U/L)   Date Value   11/30/2017 17     BUN (mg/dL)   Date Value   01/19/2021 13      (goal A1C is < 7)   (goal LDL is <100) need 30-50% reduction from baseline     BP Readings from Last 3 Encounters:   01/19/21 118/70   03/12/20 135/71   03/12/20 134/78    (goal /80)      All Future Testing planned in [facility]:      Next Visit Date:  No future appointments.          Patient Active Problem List:     Pure hypercholesterolemia     Esophageal reflux     Other abnormal glucose     Hypertrophy of prostate without urinary obstruction and other lower urinary tract symptoms (LUTS)     History of colon polyps     Epididymal cyst     Scrotal pain     Seasonal allergies     Other hemorrhoids

## 2022-01-03 DIAGNOSIS — E78.00 PURE HYPERCHOLESTEROLEMIA: ICD-10-CM

## 2022-01-03 RX ORDER — EZETIMIBE 10 MG/1
10 TABLET ORAL DAILY
Qty: 90 TABLET | Refills: 1 | Status: SHIPPED | OUTPATIENT
Start: 2022-01-03 | End: 2022-04-11 | Stop reason: SDUPTHER

## 2022-01-24 ENCOUNTER — OFFICE VISIT (OUTPATIENT)
Dept: FAMILY MEDICINE CLINIC | Age: 82
End: 2022-01-24
Payer: MEDICARE

## 2022-01-24 ENCOUNTER — HOSPITAL ENCOUNTER (OUTPATIENT)
Age: 82
Discharge: HOME | End: 2022-01-24
Payer: MEDICARE

## 2022-01-24 VITALS
SYSTOLIC BLOOD PRESSURE: 120 MMHG | HEART RATE: 72 BPM | DIASTOLIC BLOOD PRESSURE: 70 MMHG | OXYGEN SATURATION: 98 % | WEIGHT: 148 LBS | BODY MASS INDEX: 21.19 KG/M2 | HEIGHT: 70 IN

## 2022-01-24 DIAGNOSIS — Z12.5 PROSTATE CANCER SCREENING: ICD-10-CM

## 2022-01-24 DIAGNOSIS — N40.1 BENIGN PROSTATIC HYPERPLASIA WITH WEAK URINARY STREAM: ICD-10-CM

## 2022-01-24 DIAGNOSIS — Z00.00 ROUTINE GENERAL MEDICAL EXAMINATION AT A HEALTH CARE FACILITY: Primary | ICD-10-CM

## 2022-01-24 DIAGNOSIS — R39.12 BENIGN PROSTATIC HYPERPLASIA WITH WEAK URINARY STREAM: ICD-10-CM

## 2022-01-24 DIAGNOSIS — E78.00 PURE HYPERCHOLESTEROLEMIA: ICD-10-CM

## 2022-01-24 LAB
ALBUMIN SERPL-MCNC: 4.1 G/DL (ref 3.5–5.2)
ALBUMIN/GLOBULIN RATIO: ABNORMAL (ref 1–2.5)
ALP BLD-CCNC: 67 U/L (ref 40–129)
ALT SERPL-CCNC: 15 U/L (ref 5–41)
ANION GAP SERPL CALCULATED.3IONS-SCNC: 11 MMOL/L (ref 9–17)
AST SERPL-CCNC: 20 U/L
BILIRUB SERPL-MCNC: 0.46 MG/DL (ref 0.3–1.2)
BUN BLDV-MCNC: 15 MG/DL (ref 8–23)
BUN/CREAT BLD: 15 (ref 9–20)
CALCIUM SERPL-MCNC: 9.3 MG/DL (ref 8.6–10.4)
CHLORIDE BLD-SCNC: 102 MMOL/L (ref 98–107)
CHOLESTEROL/HDL RATIO: 3.9
CHOLESTEROL: 189 MG/DL
CO2: 25 MMOL/L (ref 20–31)
CREAT SERPL-MCNC: 1.02 MG/DL (ref 0.7–1.2)
GFR AFRICAN AMERICAN: >60 ML/MIN
GFR NON-AFRICAN AMERICAN: >60 ML/MIN
GFR SERPL CREATININE-BSD FRML MDRD: ABNORMAL ML/MIN/{1.73_M2}
GFR SERPL CREATININE-BSD FRML MDRD: ABNORMAL ML/MIN/{1.73_M2}
GLUCOSE BLD-MCNC: 106 MG/DL (ref 70–99)
HDLC SERPL-MCNC: 48 MG/DL
LDL CHOLESTEROL: 119 MG/DL (ref 0–130)
PATIENT FASTING?: YES
POTASSIUM SERPL-SCNC: 4.6 MMOL/L (ref 3.7–5.3)
PROSTATE SPECIFIC ANTIGEN: 2.57 UG/L
SODIUM BLD-SCNC: 138 MMOL/L (ref 135–144)
TOTAL PROTEIN: 6.7 G/DL (ref 6.4–8.3)
TRIGL SERPL-MCNC: 108 MG/DL
VLDLC SERPL CALC-MCNC: NORMAL MG/DL (ref 1–30)

## 2022-01-24 PROCEDURE — G0439 PPPS, SUBSEQ VISIT: HCPCS | Performed by: FAMILY MEDICINE

## 2022-01-24 PROCEDURE — 99213 OFFICE O/P EST LOW 20 MIN: CPT | Performed by: FAMILY MEDICINE

## 2022-01-24 PROCEDURE — G0103 PSA SCREENING: HCPCS

## 2022-01-24 PROCEDURE — 36415 COLL VENOUS BLD VENIPUNCTURE: CPT

## 2022-01-24 PROCEDURE — 80053 COMPREHEN METABOLIC PANEL: CPT

## 2022-01-24 PROCEDURE — 80061 LIPID PANEL: CPT

## 2022-01-24 SDOH — ECONOMIC STABILITY: FOOD INSECURITY: WITHIN THE PAST 12 MONTHS, YOU WORRIED THAT YOUR FOOD WOULD RUN OUT BEFORE YOU GOT MONEY TO BUY MORE.: NEVER TRUE

## 2022-01-24 SDOH — ECONOMIC STABILITY: FOOD INSECURITY: WITHIN THE PAST 12 MONTHS, THE FOOD YOU BOUGHT JUST DIDN'T LAST AND YOU DIDN'T HAVE MONEY TO GET MORE.: NEVER TRUE

## 2022-01-24 ASSESSMENT — PATIENT HEALTH QUESTIONNAIRE - PHQ9
2. FEELING DOWN, DEPRESSED OR HOPELESS: 0
SUM OF ALL RESPONSES TO PHQ QUESTIONS 1-9: 0
SUM OF ALL RESPONSES TO PHQ9 QUESTIONS 1 & 2: 0
SUM OF ALL RESPONSES TO PHQ QUESTIONS 1-9: 0
SUM OF ALL RESPONSES TO PHQ QUESTIONS 1-9: 0
1. LITTLE INTEREST OR PLEASURE IN DOING THINGS: 0
SUM OF ALL RESPONSES TO PHQ QUESTIONS 1-9: 0

## 2022-01-24 ASSESSMENT — ENCOUNTER SYMPTOMS
TROUBLE SWALLOWING: 0
NAUSEA: 0
FACIAL SWELLING: 0
COUGH: 0
DIARRHEA: 0
EYE REDNESS: 0
BLOOD IN STOOL: 0
ABDOMINAL PAIN: 0
VOMITING: 0
SHORTNESS OF BREATH: 0
EYE DISCHARGE: 0

## 2022-01-24 ASSESSMENT — LIFESTYLE VARIABLES
HOW OFTEN DO YOU HAVE A DRINK CONTAINING ALCOHOL: 1
HOW OFTEN DO YOU HAVE SIX OR MORE DRINKS ON ONE OCCASION: 0
HAVE YOU OR SOMEONE ELSE BEEN INJURED AS A RESULT OF YOUR DRINKING: 0
HOW OFTEN DURING THE LAST YEAR HAVE YOU FOUND THAT YOU WERE NOT ABLE TO STOP DRINKING ONCE YOU HAD STARTED: 0
HOW OFTEN DURING THE LAST YEAR HAVE YOU HAD A FEELING OF GUILT OR REMORSE AFTER DRINKING: 0
HAS A RELATIVE, FRIEND, DOCTOR, OR ANOTHER HEALTH PROFESSIONAL EXPRESSED CONCERN ABOUT YOUR DRINKING OR SUGGESTED YOU CUT DOWN: 0
HOW OFTEN DURING THE LAST YEAR HAVE YOU BEEN UNABLE TO REMEMBER WHAT HAPPENED THE NIGHT BEFORE BECAUSE YOU HAD BEEN DRINKING: 0
HOW OFTEN DURING THE LAST YEAR HAVE YOU NEEDED AN ALCOHOLIC DRINK FIRST THING IN THE MORNING TO GET YOURSELF GOING AFTER A NIGHT OF HEAVY DRINKING: 0
HOW OFTEN DURING THE LAST YEAR HAVE YOU FAILED TO DO WHAT WAS NORMALLY EXPECTED FROM YOU BECAUSE OF DRINKING: 0

## 2022-01-24 ASSESSMENT — SOCIAL DETERMINANTS OF HEALTH (SDOH): HOW HARD IS IT FOR YOU TO PAY FOR THE VERY BASICS LIKE FOOD, HOUSING, MEDICAL CARE, AND HEATING?: NOT HARD AT ALL

## 2022-01-24 NOTE — PROGRESS NOTES
HPI Notes    Name: Karlos Werner  : 1940        Chief Complaint:     Chief Complaint   Patient presents with    Medicare AWV    Benign Prostatic Hypertrophy     Pt wants referral to urology. Pt noted slower stream       History of Present Illness:     Karlos Werner is a 80 y.o.  male who presents with Medicare AWV and Benign Prostatic Hypertrophy (Pt wants referral to urology. Pt noted slower stream)      HPI  BPH- Pt has noted some weaker urine stream recently. No blood in urine. No pain with urination. Pt tries to keep hydrated. Pt had a \"ream job\" for his prostate years ago with Dr Funmilayo Beck. Pt would like to [facility]rolog[facility] at [facility], Dr Adia collins. Hypercholesterolemia - chronic but stable. Pt does take the zetia daily. Past Medical History:     Past Medical History:   Diagnosis Date    GERD (gastroesophageal reflux disease)     Hyperlipidemia       Reviewed all health maintenance requirements and ordered appropriate tests  Health Maintenance Due   Topic Date Due    Hepatitis C screen  Never done    Annual Wellness Visit (AWV)  2022       Past Surgical History:     Past Surgical History:   Procedure Laterality Date    COLONOSCOPY      polyp    COLONOSCOPY  2016    sigmoid diverticula    INTRACAPSULAR CATARACT EXTRACTION      Bilat    TONSILLECTOMY          Medications:       Prior to Admission medications    Medication Sig Start Date End Date Taking?  Authorizing Provider   ezetimibe (ZETIA) 10 MG tablet Take 1 tablet by mouth daily 1/3/22  Yes Marylin Medley MD   levocetirizine (XYZAL) 5 MG tablet Take 1 tablet by mouth nightly 21  Yes Marylin Medley MD   calcium carbonate (OSCAL) 500 MG TABS tablet Take 500 mg by mouth daily Two tablet a day   Yes Historical Provider, MD   fluticasone (FLONASE) 50 MCG/ACT nasal spray 2 sprays by Each Nostril route daily  Patient not taking: Reported on 2022   Marylin Mdeley MD        Allergies: Seasonal    Social History:     Tobacco:    reports that he quit smoking about 52 years ago. His smoking use included cigarettes. He has a 2.50 pack-year smoking history. He has never used smokeless tobacco.  Alcohol:      reports current alcohol use of about 1.0 standard drink of alcohol per week. Drug Use:  reports no history of drug use. Family History:     Family History   Problem Relation Age of Onset   Ros Bran Parkinsonism Mother     Heart Disease Father     Crohn's Disease Sister        Review of Systems:       Review of Systems   Constitutional: Negative for chills, fatigue and fever. HENT: Negative for facial swelling and trouble swallowing. Eyes: Negative for discharge, redness and visual disturbance. Respiratory: Negative for cough and shortness of breath. Cardiovascular: Negative for chest pain, palpitations and leg swelling. Gastrointestinal: Negative for abdominal pain, blood in stool, diarrhea, nausea and vomiting. Genitourinary: Negative for dysuria and hematuria. Slower stream    Musculoskeletal: Negative for joint swelling and neck stiffness. Skin: Negative for pallor and rash. Neurological: Negative for dizziness, facial asymmetry, light-headedness and headaches. Psychiatric/Behavioral: Negative for sleep disturbance. Physical Exam:     Physical Exam  Vitals reviewed. Constitutional:       General: He is not in acute distress. Appearance: Normal appearance. He is well-developed. He is not ill-appearing. HENT:      Head: Normocephalic and atraumatic. Right Ear: Tympanic membrane normal.      Left Ear: Tympanic membrane normal.   Eyes:      General:         Right eye: No discharge. Left eye: No discharge. Conjunctiva/sclera: Conjunctivae normal.   Neck:      Thyroid: No thyromegaly. Cardiovascular:      Rate and Rhythm: Normal rate and regular rhythm. Heart sounds: Normal heart sounds. No murmur heard.       Pulmonary:      Effort: Pulmonary effort is normal. No respiratory distress. Breath sounds: Normal breath sounds. No wheezing. Abdominal:      General: Bowel sounds are normal. There is no distension. Palpations: Abdomen is soft. Tenderness: There is no abdominal tenderness. Musculoskeletal:      Cervical back: Neck supple. Right lower leg: No edema. Left lower leg: No edema. Lymphadenopathy:      Cervical: No cervical adenopathy. Skin:     Findings: No erythema or rash. Neurological:      Mental Status: He is alert and oriented to person, place, and time. Psychiatric:         Mood and Affect: Mood normal.         Behavior: Behavior normal.         Vitals:  /70   Pulse 72   Ht 5' 10\" (1.778 m)   Wt 148 lb (67.1 kg)   SpO2 98%   BMI 21.24 kg/m²       Data:     Lab Results   Component Value Date     01/19/2021    K 4.4 01/19/2021     01/19/2021    CO2 27 01/19/2021    BUN 13 01/19/2021    CREATININE 1.08 01/19/2021    GLUCOSE 100 01/19/2021    LABALBU 3.9 07/03/2012    BILITOT 1.05 07/03/2012    ALKPHOS 59 07/03/2012    AST 24 11/30/2017    ALT 17 11/30/2017     Lab Results   Component Value Date    WBC 5.0 11/30/2017    RBC 5.07 11/30/2017    HGB 15.6 11/30/2017    HCT 46.4 11/30/2017    MCV 91.5 11/30/2017    MCH 30.7 11/30/2017    MCHC 33.5 11/30/2017    RDW 12.5 01/13/2014     11/30/2017    MPV NOT REPORTED 01/13/2014     Lab Results   Component Value Date    TSH 2.99 11/21/2019     Lab Results   Component Value Date    CHOL 184 01/19/2021    CHOL 233 11/21/2019    HDL 49 01/19/2021    PSA 2.89 01/19/2021    LABA1C 5.6 01/19/2021          Assessment/Plan:        1. Routine general medical examination at a health care facility  Completed     2. Pure hypercholesterolemia  Stable on zetia and labs soon   - Lipid Panel; Future  - Comprehensive Metabolic Panel; Future    3.  Benign prostatic hyperplasia with weak urinary stream  Referral to urology  - [facility]  Feliciano Melton,

## 2022-01-24 NOTE — PROGRESS NOTES
Medicare Annual Wellness Visit  Name: Arcenio Izaguirre Date: 2022   MRN: [id number] Sex: Male   Age: 80 y.o. Ethnicity: Non- / Non    : 1940 Race: White (non-)      Ella Morrell is here for Medicare AWV and Benign Prostatic Hypertrophy (Pt wants referral to urology. Pt noted slower stream)    Screenings for behavioral, psychosocial and functional/safety risks, and cognitive dysfunction are all negative except as indicated below. These results, as well as other patient data from the [address] form, are documented in Flowsheets linked to this Encounter. Allergies   Allergen Reactions    Seasonal        Prior to Visit Medications    Medication Sig Taking?  Authorizing Provider   ezetimibe (ZETIA) 10 MG tablet Take 1 tablet by mouth daily Yes Meghann Almaguer MD   levocetirizine (XYZAL) 5 MG tablet Take 1 tablet by mouth nightly Yes Meghann Almaguer MD   calcium carbonate (OSCAL) 500 MG TABS tablet Take 500 mg by mouth daily Two tablet a day Yes Historical Provider, MD   fluticasone (FLONASE) 50 MCG/ACT nasal spray 2 sprays by Each Nostril route daily  Patient not taking: Reported on 2022  Meghann Almaguer MD       Past Medical History:   Diagnosis Date    GERD (gastroesophageal reflux disease)     Hyperlipidemia        Past Surgical History:   Procedure Laterality Date    COLONOSCOPY      polyp    COLONOSCOPY  2016    sigmoid diverticula    INTRACAPSULAR CATARACT EXTRACTION      Bilat    TONSILLECTOMY         Family History   Problem Relation Age of Onset    Parkinsonism Mother     Heart Disease Father     Crohn's Disease Sister        CareTeam (Including outside providers/suppliers regularly involved in providing care):   Patient Care Team:  Meghann Almaguer MD as PCP - General (Family Medicine)  Meghann Almaguer MD as PCP - REHABILITATION [facility] Empaneled Provider    Wt Readings from Last 3 Encounters:   22 148 lb (67.1 kg)   21 148 lb (67.1 kg)   03/12/20 156 lb (70.8 kg)     Vitals:    01/24/22 0711   BP: 120/70   Pulse: 72   SpO2: 98%   Weight: 148 lb (67.1 kg)   Height: 5' 10\" (1.778 m)     Body mass index is 21.24 kg/m². Based upon direct observation of the patient, evaluation of cognition reveals recent and remote memory intact. Patient's complete Health Risk Assessment and screening values have been reviewed and are found in Flowsheets. The following problems were reviewed today and where indicated follow up appointments were made and/or referrals ordered. Positive Risk Factor Screenings with Interventions:            General Health and ACP:  General  In general, how would you say your health is?: Very Good  In the past 7 days, have you experienced any of the following?  New or Increased Pain, New or Increased Fatigue, Loneliness, Social Isolation, Stress or Anger?: None of These  Do you get the social and emotional support that you need?: Yes  Do you have a Living Will?: Yes  Advance Directives     Power of  Living Will ACP-Advance Directive ACP-Power of     Not on File Not on File Not on File Not on File      General Health Risk Interventions:  · doing well and no concerns          Personalized Preventive Plan   Current Health Maintenance Status  Immunization History   Administered Date(s) Administered    COVID-19, Samantha Mcdonald, Primary or Immunocompromised, PF, 100mcg/0.5mL 01/26/2021, 02/23/2021, 10/28/2021    Hepatitis A 04/01/2008, 10/09/2008    Hepatitis A Ped/Adol (Havrix, Vaqta) 04/01/2008, 10/09/2008    Hepatitis B 09/09/2009, 10/09/2009, 03/02/2010    Hepatitis B Ped/Adol (Engerix-B, Recombivax HB) 09/09/2009, 10/09/2009, 03/02/2010    Influenza 11/07/2011, 10/18/2012, 10/23/2013    Influenza Vaccine, unspecified formulation 10/23/2013    Influenza Virus Vaccine 10/15/2014, 10/23/2015    Influenza, High Dose (Fluzone 65 yrs and older) 10/01/2020    Influenza, Quadv, IM, PF (6 mo and older Fluzone, Flulaval, Fluarix, and 3 yrs and older Afluria) 11/03/2016, 10/11/2017, 10/18/2018, 10/09/2019    Influenza, Quadv, adjuvanted, 65 yrs +, IM, PF (Fluad) 10/01/2020, 09/27/2021    MMR 09/15/2009    Meningococcal MCV4P (Menactra) 09/09/2009    PPD Test 01/13/2014    Pneumococcal Conjugate 13-valent (Llobpit84) 11/03/2016    Pneumococcal Polysaccharide ([id number]) 09/15/2009    Polio IPV (IPOL) 09/09/2009    Tdap (Boostrix, Adacel) 09/09/2009, 12/11/2019    Yellow Fever (YF-Vax) 10/13/2009    Zoster Live (Zostavax) 12/12/2011    Zoster Recombinant (Shingrix) 03/26/2019, 06/25/2019        Health Maintenance   Topic Date Due    Hepatitis C screen  Never done    Annual Wellness Visit (AWV)  01/20/2022    Depression Screen  01/24/2023    DTaP/Tdap/Td vaccine (3 - Td or Tdap) 12/11/2029    Flu vaccine  Completed    Shingles Vaccine  Completed    Pneumococcal 65+ years Vaccine  Completed    COVID-19 Vaccine  Completed    Hepatitis A vaccine  Aged Out    Hepatitis B vaccine  Aged Out    Hib vaccine  Aged Out    Meningococcal (ACWY) vaccine  Aged Out     Recommendations for [employer] Due: see orders and patient instructions/AVS.  . Recommended screening schedule for the next 5-10 years is provided to the patient in written form: see Patient Instructions/AVS.    Alberta Coleman was seen today for medicare awv and benign prostatic hypertrophy.     Diagnoses and all orders for this visit:    Routine general medical examination at a health care facility    Pure hypercholesterolemia    Benign prostatic hyperplasia with weak urinary stream    Prostate cancer screening

## 2022-03-10 ENCOUNTER — OFFICE VISIT (OUTPATIENT)
Dept: UROLOGY | Age: 82
End: 2022-03-10
Payer: MEDICARE

## 2022-03-10 ENCOUNTER — HOSPITAL ENCOUNTER (OUTPATIENT)
Age: 82
Setting detail: SPECIMEN
Discharge: HOME | End: 2022-03-10
Payer: MEDICARE

## 2022-03-10 VITALS
OXYGEN SATURATION: 97 % | SYSTOLIC BLOOD PRESSURE: 138 MMHG | BODY MASS INDEX: 23.04 KG/M2 | DIASTOLIC BLOOD PRESSURE: 62 MMHG | HEIGHT: 68 IN | HEART RATE: 61 BPM | WEIGHT: 152 LBS

## 2022-03-10 DIAGNOSIS — N45.2 ORCHITIS: ICD-10-CM

## 2022-03-10 DIAGNOSIS — R35.1 NOCTURIA: ICD-10-CM

## 2022-03-10 DIAGNOSIS — N13.8 BPH WITH OBSTRUCTION/LOWER URINARY TRACT SYMPTOMS: Primary | ICD-10-CM

## 2022-03-10 DIAGNOSIS — K60.2: ICD-10-CM

## 2022-03-10 DIAGNOSIS — N50.3 EPIDIDYMAL CYST: ICD-10-CM

## 2022-03-10 DIAGNOSIS — N40.1 BPH WITH OBSTRUCTION/LOWER URINARY TRACT SYMPTOMS: Primary | ICD-10-CM

## 2022-03-10 LAB
-: NORMAL
BILIRUBIN URINE: NEGATIVE
COLOR: YELLOW
COMMENT UA: NORMAL
EPITHELIAL CELLS UA: NORMAL /HPF
GLUCOSE URINE: NEGATIVE
KETONES, URINE: NEGATIVE
LEUKOCYTE ESTERASE, URINE: NEGATIVE
NITRITE, URINE: NEGATIVE
PH UA: 7 (ref 5–8)
PROTEIN UA: NEGATIVE
SPECIFIC GRAVITY UA: 1.01 (ref 1–1.03)
TURBIDITY: CLEAR
URINE HGB: NEGATIVE
UROBILINOGEN, URINE: NORMAL

## 2022-03-10 PROCEDURE — 51798 US URINE CAPACITY MEASURE: CPT | Performed by: PHYSICIAN ASSISTANT

## 2022-03-10 PROCEDURE — 81001 URINALYSIS AUTO W/SCOPE: CPT

## 2022-03-10 PROCEDURE — 99204 OFFICE O/P NEW MOD 45 MIN: CPT | Performed by: PHYSICIAN ASSISTANT

## 2022-03-10 PROCEDURE — 87086 URINE CULTURE/COLONY COUNT: CPT

## 2022-03-10 RX ORDER — TAMSULOSIN HYDROCHLORIDE 0.4 MG/1
0.4 CAPSULE ORAL EVERY EVENING
Qty: 30 CAPSULE | Refills: 5 | Status: SHIPPED | OUTPATIENT
Start: 2022-03-10 | End: 2022-04-21 | Stop reason: SDUPTHER

## 2022-03-10 RX ORDER — DOCUSATE SODIUM 100 MG/1
100 CAPSULE, LIQUID FILLED ORAL DAILY
Qty: 30 CAPSULE | Refills: 5
Start: 2022-03-10

## 2022-03-10 RX ORDER — LEVOFLOXACIN 500 MG/1
500 TABLET, FILM COATED ORAL DAILY
Qty: 10 TABLET | Refills: 0 | Status: SHIPPED | OUTPATIENT
Start: 2022-03-10 | End: 2022-03-20

## 2022-03-10 ASSESSMENT — ENCOUNTER SYMPTOMS
ABDOMINAL PAIN: 0
WHEEZING: 0
VOMITING: 0
CONSTIPATION: 1
SHORTNESS OF BREATH: 0
BACK PAIN: 0
EYE REDNESS: 0
NAUSEA: 0
COUGH: 0
COLOR CHANGE: 0

## 2022-03-10 NOTE — PROGRESS NOTES
HPI:      Patient is a 80 y.o. male in no acute distress. He is alert and oriented to person, place, and time. History  ~15 years ago TURP - Dr. West Late    12/2017 -  right scrotal pain and swelling. Patient states he first noticed symptoms 3-4 months ago. He describes his pain as a mild discomfort. He states that since the onset of his symptoms the pain or swelling has not become any worse. Dr. Gigi Farah did order a scrotal ultrasound on 12/11/17. This was independently reviewed at today's visit and show multiple right epididymal cyst and a right extratesticular cyst.  There is no hydroceles bilaterally. Patient denies any known trauma to the area. He denies perineal or penile pain. He denies dysuria, gross hematuria, frequency, urgency, or incontinence. He has never seen urology in the past.    Follow up as needed    Today:  Patient is here today to reestablish care for BPH. Patient was seen in the past for right epididymitis/epididymal cyst.  Patient is having nocturia. Patient states that he does have right groin/testicle pain when he rid a horse several weeks ago. He states that pain was severe. Patient is not on any BPH medications. Patient did have lab work 1/24/2022 which did show a BUN 15, creatinine of 1.02 and a GFR greater than 60. Patient did have a PSA of 2.57 at the time. Has daily BM. Drinks 2 cups coffee morning and ~48 oz water a day. Occasional tea. Rare EtOH. Urinates every 2 hours during the day. Nocturia X 3-4. He drinks water every time he gets up to urinate. Denies dysuria or gross hematuria.  Bladderscan performed in office today: Patient voided - 100 mL, PVR - 36mL    Past Medical History:   Diagnosis Date    GERD (gastroesophageal reflux disease)     Hyperlipidemia      Past Surgical History:   Procedure Laterality Date    COLONOSCOPY  2006    polyp    COLONOSCOPY  01/04/2016    sigmoid diverticula    CYSTOSCOPY      NOMS-     INTRACAPSULAR CATARACT EXTRACTION      Bilat Baltazar salazar- Dr. Arleth Cifuentes (15 years ago)    TONSILLECTOMY       Outpatient Encounter Medications as of 3/10/2022   Medication Sig Dispense Refill    levoFLOXacin (LEVAQUIN) 500 MG tablet Take 1 tablet by mouth daily for 10 days 10 tablet 0    docusate sodium (COLACE) 100 MG capsule Take 1 capsule by mouth daily 30 capsule 5    tamsulosin (FLOMAX) 0.4 MG capsule Take 1 capsule by mouth every evening 30 capsule 5    ezetimibe (ZETIA) 10 MG tablet Take 1 tablet by mouth daily 90 tablet 1    calcium carbonate (OSCAL) 500 MG TABS tablet Take 500 mg by mouth daily Two tablet a day      levocetirizine (XYZAL) 5 MG tablet Take 1 tablet by mouth nightly (Patient not taking: Reported on 3/10/2022) 90 tablet 3    fluticasone (FLONASE) 50 MCG/ACT nasal spray 2 sprays by Each Nostril route daily (Patient not taking: Reported on 3/10/2022) 1 Bottle 0     No facility-administered encounter medications on file as of 3/10/2022. Current Outpatient Medications on File Prior to Visit   Medication Sig Dispense Refill    ezetimibe (ZETIA) 10 MG tablet Take 1 tablet by mouth daily 90 tablet 1    calcium carbonate (OSCAL) 500 MG TABS tablet Take 500 mg by mouth daily Two tablet a day      levocetirizine (XYZAL) 5 MG tablet Take 1 tablet by mouth nightly (Patient not taking: Reported on 3/10/2022) 90 tablet 3    fluticasone (FLONASE) 50 MCG/ACT nasal spray 2 sprays by Each Nostril route daily (Patient not taking: Reported on 3/10/2022) 1 Bottle 0     No current facility-administered medications on file prior to visit.      Seasonal  Family History   Problem Relation Age of Onset    Parkinsonism Mother     Heart Disease Father     Crohn's Disease Sister      Social History     Tobacco Use   Smoking Status Former Smoker    Packs/day: 0.25    Years: 10.00    Pack years: 2.50    Types: Cigarettes    Quit date: 1970    Years since quittin.2   Smokeless Tobacco Never Used       Social History     Substance and Sexual Activity   Alcohol Use Yes    Alcohol/week: 1.0 standard drink    Types: 1 Glasses of wine per week    Comment: rare       Review of Systems   Constitutional: Negative for appetite change, chills and fever. Eyes: Negative for redness and visual disturbance. Respiratory: Negative for cough, shortness of breath and wheezing. Cardiovascular: Negative for chest pain and leg swelling. Gastrointestinal: Positive for constipation. Negative for abdominal pain, nausea and vomiting. Positive for anal fissure   Genitourinary: Positive for scrotal swelling and testicular pain. Negative for decreased urine volume, difficulty urinating, dysuria, enuresis, flank pain, frequency, hematuria, penile discharge, penile pain and urgency. Positive for nocturia   Musculoskeletal: Negative for back pain, joint swelling and myalgias. Skin: Negative for color change, rash and wound. Neurological: Negative for dizziness, tremors and numbness. Hematological: Negative for adenopathy. Does not bruise/bleed easily. /62 (Site: Right Upper Arm, Position: Sitting, Cuff Size: Medium Adult)   Pulse 61   Ht 5' 8\" (1.727 m)   Wt 152 lb (68.9 kg)   SpO2 97%   BMI 23.11 kg/m²       PHYSICAL EXAM:  Constitutional: Patient in no acute distress; Neuro: alert and oriented to person place and time. Psych: Mood and affect normal.  Lungs: Respiratory effort normal  Abdomen: Soft, non-tender, non-distended  Bladder non-tender and not distended.   Penis normal  Urethral meatus 70% of normal caliber  Scrotal angiokeratomas  Testicles normal on left, on right large hydrocele vs cyst  Epididymis normal on left, unable to differentiate on right  Rectal: Superficial anal fissuring        Lab Results   Component Value Date    BUN 15 01/24/2022     Lab Results   Component Value Date    CREATININE 1.02 01/24/2022     Lab Results   Component Value Date    PSA 2.57 01/24/2022    PSA 2.89 01/19/2021    PSA 3.68 11/21/2019       ASSESSMENT:   Diagnosis Orders   1. BPH with obstruction/lower urinary tract symptoms  CA MEASUREMENT,POST-VOID RESIDUAL VOLUME BY US,NON-IMAGING    tamsulosin (FLOMAX) 0.4 MG capsule   2. Nocturia  Culture, Urine    Urinalysis with Microscopic    tamsulosin (FLOMAX) 0.4 MG capsule   3. Epididymal cyst  US SCROTUM W LIMITED DUPLEX   4. Orchitis  levoFLOXacin (LEVAQUIN) 500 MG tablet    US SCROTUM W LIMITED DUPLEX   5. Patent anus with fissure           PLAN:  We will check urinalysis culture. We will call him with results. We will continue antibiotics even if urine culture is negative for orchitis.      Levaquin x10 days     Start colace daily    Increase fiber intake    Increase water intake    Sitz baths      Start Flomax-discussed risk of Flomax including lightheadedness dizziness and retrograde ejaculation    Prior to his next visit we will check a scrotal ultrasound to evaluate him for possible surgical intervention of his right epididymal cyst versus hydrocele    Follow-up in 6 weeks with PVR and scrotal ultrasound

## 2022-03-11 LAB
CULTURE: NO GROWTH
SPECIMEN DESCRIPTION: NORMAL

## 2022-03-14 ENCOUNTER — TELEPHONE (OUTPATIENT)
Dept: UROLOGY | Age: 82
End: 2022-03-14

## 2022-03-14 NOTE — RESULT ENCOUNTER NOTE
Please call pt - urine culture reviewed and does not show UTI    Complete course of antibiotics as discussed in the office

## 2022-03-14 NOTE — TELEPHONE ENCOUNTER
----- Message from Ermelinda Quiroz PA-C sent at 3/14/2022 11:28 AM EDT -----  Please call pt - urine culture reviewed and does not show UTI    Complete course of antibiotics as discussed in the office

## 2022-04-11 ENCOUNTER — TELEPHONE (OUTPATIENT)
Dept: FAMILY MEDICINE CLINIC | Age: 82
End: 2022-04-11

## 2022-04-11 ENCOUNTER — HOSPITAL ENCOUNTER (OUTPATIENT)
Dept: ULTRASOUND IMAGING | Age: 82
Discharge: HOME | End: 2022-04-13
Payer: MEDICARE

## 2022-04-11 DIAGNOSIS — E78.00 PURE HYPERCHOLESTEROLEMIA: ICD-10-CM

## 2022-04-11 DIAGNOSIS — N50.3 EPIDIDYMAL CYST: ICD-10-CM

## 2022-04-11 DIAGNOSIS — N45.2 ORCHITIS: ICD-10-CM

## 2022-04-11 PROCEDURE — 93976 VASCULAR STUDY: CPT

## 2022-04-11 RX ORDER — LEVOCETIRIZINE DIHYDROCHLORIDE 5 MG/1
5 TABLET, FILM COATED ORAL NIGHTLY
Qty: 90 TABLET | Refills: 3 | Status: SHIPPED | OUTPATIENT
Start: 2022-04-11

## 2022-04-11 RX ORDER — EZETIMIBE 10 MG/1
10 TABLET ORAL DAILY
Qty: 90 TABLET | Refills: 1 | Status: SHIPPED | OUTPATIENT
Start: 2022-04-11

## 2022-04-11 NOTE — TELEPHONE ENCOUNTER
Patient asking for new scripts for Ezetimibe & Levocetirizine - patient would like scripts sent to [facility] Maintenance   Topic Date Due    Depression Screen  01/24/2023    Annual Wellness Visit (AWV)  01/25/2023    DTaP/Tdap/Td vaccine (3 - Td or Tdap) 12/11/2029    Flu vaccine  Completed    Shingles Vaccine  Completed    Pneumococcal 65+ years Vaccine  Completed    COVID-19 Vaccine  Completed    Hepatitis A vaccine  Aged Out    Hepatitis B vaccine  Aged Out    Hib vaccine  Aged Out    Meningococcal (ACWY) vaccine  Aged Out             (applicable per patient's age: Cancer Screenings, Depression Screening, Fall Risk Screening, Immunizations)    Hemoglobin A1C (%)   Date Value   01/19/2021 5.6   11/21/2019 6.0   11/01/2018 5.4     LDL Cholesterol (mg/dL)   Date Value   01/24/2022 119     LDL Calculated (mg/dL)   Date Value   11/21/2019 158     AST (U/L)   Date Value   01/24/2022 20     ALT (U/L)   Date Value   01/24/2022 15     BUN (mg/dL)   Date Value   01/24/2022 15      (goal A1C is < 7)   (goal LDL is <100) need 30-50% reduction from baseline     BP Readings from Last 3 Encounters:   03/10/22 138/62   01/24/22 120/70   01/19/21 118/70    (goal /80)      All Future Testing planned in [facility]:      Next Visit Date:  Future Appointments   Date Time Provider Rashmi Montemayor   4/21/2022  1:00 PM MD Ziyad Garcia [facility]            Patient Active Problem List:     Pure hypercholesterolemia     Esophageal reflux     Other abnormal glucose     Hypertrophy of prostate without urinary obstruction and other lower urinary tract symptoms (LUTS)     History of colon polyps     Epididymal cyst     Scrotal pain     Seasonal allergies     Other hemorrhoids

## 2022-04-21 ENCOUNTER — OFFICE VISIT (OUTPATIENT)
Dept: UROLOGY | Age: 82
End: 2022-04-21
Payer: MEDICARE

## 2022-04-21 VITALS — DIASTOLIC BLOOD PRESSURE: 76 MMHG | WEIGHT: 152 LBS | SYSTOLIC BLOOD PRESSURE: 118 MMHG | BODY MASS INDEX: 23.11 KG/M2

## 2022-04-21 DIAGNOSIS — R35.1 NOCTURIA: ICD-10-CM

## 2022-04-21 DIAGNOSIS — N50.3 EPIDIDYMAL CYST: Primary | ICD-10-CM

## 2022-04-21 DIAGNOSIS — N40.1 BPH WITH OBSTRUCTION/LOWER URINARY TRACT SYMPTOMS: ICD-10-CM

## 2022-04-21 DIAGNOSIS — N13.8 BPH WITH OBSTRUCTION/LOWER URINARY TRACT SYMPTOMS: ICD-10-CM

## 2022-04-21 PROCEDURE — 99213 OFFICE O/P EST LOW 20 MIN: CPT | Performed by: PHYSICIAN ASSISTANT

## 2022-04-21 PROCEDURE — 51798 US URINE CAPACITY MEASURE: CPT | Performed by: PHYSICIAN ASSISTANT

## 2022-04-21 RX ORDER — TAMSULOSIN HYDROCHLORIDE 0.4 MG/1
0.4 CAPSULE ORAL EVERY EVENING
Qty: 90 CAPSULE | Refills: 3 | Status: SHIPPED | OUTPATIENT
Start: 2022-04-21 | End: 2022-10-31 | Stop reason: SDUPTHER

## 2022-04-21 ASSESSMENT — ENCOUNTER SYMPTOMS
EYE REDNESS: 0
CONSTIPATION: 0
BACK PAIN: 0
NAUSEA: 0
COLOR CHANGE: 0
ABDOMINAL PAIN: 0
SHORTNESS OF BREATH: 0
COUGH: 0
WHEEZING: 0
VOMITING: 0

## 2022-04-21 NOTE — PROGRESS NOTES
HPI:      Patient is a [de-identified] y.o. male in no acute distress. He is alert and oriented to person, place, and time. History  ~15 years ago TURP - Dr. Laverne Valentin     12/2017 -  right scrotal pain and swelling.  Patient states he first noticed symptoms 3-4 months ago. Latoya Wiggins describes his pain as a mild discomfort. Latoya Wiggins states that since the onset of his symptoms the pain or swelling has not become any worse.  Dr. Anne Hamm did order a scrotal ultrasound on 12/11/17.  This was independently reviewed at today's visit and show multiple right epididymal cyst and a right extratesticular cyst. Joselyn [place] is no hydroceles bilaterally.  Patient denies any known trauma to the area. Latoya Wiggins denies perineal or penile pain.  He denies dysuria, gross hematuria, frequency, urgency, or incontinence. Latoya Wiggins has never seen urology in the past.     Follow up as needed     Today:  Patient is here today for follow-up orchitis, epididymal cyst, nocturia, constipation, BPH. At last visit we gave him a 10-day course of Levaquin for his orchitis. Patient states that he still has some intermittent discomfort of his right testicle but it is not too bothersome for him. We also started Flomax for BPH. Patient states that since starting the Flomax he has noticed his nocturia has significantly improved. Patient did have a scrotal ultrasound prior to the visit today which is independently reviewed showing multiple right epididymal cyst with largest measuring 8.4 cm. On radiology read there is a hypoechoic nodule left testicle for which radiology recommends an ultrasound to follow-up in 3 to 6 months.  Bladderscan performed in office today: Pt voided - 100 mL, PVR - 14 mL    Past Medical History:   Diagnosis Date    GERD (gastroesophageal reflux disease)     Hyperlipidemia      Past Surgical History:   Procedure Laterality Date    COLONOSCOPY  2006    polyp    COLONOSCOPY  01/04/2016    sigmoid diverticula    CYSTOSCOPY      NOMS-     INTRACAPSULAR CATARACT Black salazar- Dr. Macario Blue (15 years ago)    TONSILLECTOMY       Outpatient Encounter Medications as of 4/21/2022   Medication Sig Dispense Refill    Calcium Carb-Cholecalciferol 600-500 MG-UNIT CAPS Take by mouth      tamsulosin (FLOMAX) 0.4 MG capsule Take 1 capsule by mouth every evening 90 capsule 3    levocetirizine (XYZAL) 5 MG tablet Take 1 tablet by mouth nightly 90 tablet 3    ezetimibe (ZETIA) 10 MG tablet Take 1 tablet by mouth daily 90 tablet 1    fluticasone (FLONASE) 50 MCG/ACT nasal spray 2 sprays by Each Nostril route daily 1 Bottle 0    docusate sodium (COLACE) 100 MG capsule Take 1 capsule by mouth daily (Patient not taking: Reported on 4/21/2022) 30 capsule 5    [DISCONTINUED] tamsulosin (FLOMAX) 0.4 MG capsule Take 1 capsule by mouth every evening 30 capsule 5    calcium carbonate (OSCAL) 500 MG TABS tablet Take 500 mg by mouth daily Two tablet a day (Patient not taking: Reported on 4/21/2022)       No facility-administered encounter medications on file as of 4/21/2022. Current Outpatient Medications on File Prior to Visit   Medication Sig Dispense Refill    Calcium Carb-Cholecalciferol 600-500 MG-UNIT CAPS Take by mouth      levocetirizine (XYZAL) 5 MG tablet Take 1 tablet by mouth nightly 90 tablet 3    ezetimibe (ZETIA) 10 MG tablet Take 1 tablet by mouth daily 90 tablet 1    fluticasone (FLONASE) 50 MCG/ACT nasal spray 2 sprays by Each Nostril route daily 1 Bottle 0    docusate sodium (COLACE) 100 MG capsule Take 1 capsule by mouth daily (Patient not taking: Reported on 4/21/2022) 30 capsule 5    calcium carbonate (OSCAL) 500 MG TABS tablet Take 500 mg by mouth daily Two tablet a day (Patient not taking: Reported on 4/21/2022)       No current facility-administered medications on file prior to visit.      Seasonal  Family History   Problem Relation Age of Onset    Parkinsonism Mother     Heart Disease Father     Crohn's Disease Sister      Social History     Tobacco Use   Smoking Status Former Smoker    Packs/day: 0.25    Years: 10.00    Pack years: 2.50    Types: Cigarettes    Quit date: 1970    Years since quittin.3   Smokeless Tobacco Never Used       Social History     Substance and Sexual Activity   Alcohol Use Yes    Alcohol/week: 1.0 standard drink    Types: 1 Glasses of wine per week    Comment: rare       Review of Systems   Constitutional: Negative for appetite change, chills and fever. Eyes: Negative for redness and visual disturbance. Respiratory: Negative for cough, shortness of breath and wheezing. Cardiovascular: Negative for chest pain and leg swelling. Gastrointestinal: Negative for abdominal pain, constipation, nausea and vomiting. Genitourinary: Positive for scrotal swelling. Negative for decreased urine volume, difficulty urinating, dysuria, enuresis, flank pain, frequency, hematuria, penile discharge, penile pain, testicular pain and urgency. Positive for nocturia   Musculoskeletal: Negative for back pain, joint swelling and myalgias. Skin: Negative for color change, rash and wound. Neurological: Negative for dizziness, tremors and numbness. Hematological: Negative for adenopathy. Does not bruise/bleed easily. /76 (Site: Left Upper Arm, Position: Sitting, Cuff Size: Medium Adult)   Wt 152 lb (68.9 kg)   BMI 23.11 kg/m²       PHYSICAL EXAM:  Constitutional: Patient in no acute distress; Neuro: alert and oriented to person place and time. Psych: Mood and affect normal.  Lungs: Respiratory effort normal  Abdomen: Soft, non-tender, non-distended  Rectal: deferred       Lab Results   Component Value Date    BUN 15 2022     Lab Results   Component Value Date    CREATININE 1.02 2022     Lab Results   Component Value Date    PSA 2.57 2022    PSA 2.89 2021    PSA 3.68 2019       ASSESSMENT:   Diagnosis Orders   1.  Epididymal cyst  US SCROTUM W LIMITED DUPLEX   2. BPH with obstruction/lower urinary tract symptoms  tamsulosin (FLOMAX) 0.4 MG capsule   3. Nocturia  tamsulosin (FLOMAX) 0.4 MG capsule         PLAN:  Continue Flomax    We did discuss with him surgery to remove his right epididymal cysts, at this time he wishes to hold off. Secondary to hypoechogenic finding on left testicle we will order a repeat scrotal ultrasound in 6 months. This will also serve to reevaluate the size of his right epididymal cyst.    Follow-up in 6 months with scrotal ultrasound and PVR, we will discuss with him if he wants any surgical intervention for his right epididymal cyst at that time. warm

## 2022-06-29 ENCOUNTER — OFFICE VISIT (OUTPATIENT)
Dept: FAMILY MEDICINE CLINIC | Age: 82
End: 2022-06-29
Payer: MEDICARE

## 2022-06-29 VITALS
OXYGEN SATURATION: 99 % | HEIGHT: 68 IN | WEIGHT: 150.4 LBS | RESPIRATION RATE: 20 BRPM | SYSTOLIC BLOOD PRESSURE: 134 MMHG | DIASTOLIC BLOOD PRESSURE: 70 MMHG | HEART RATE: 64 BPM | BODY MASS INDEX: 22.79 KG/M2

## 2022-06-29 DIAGNOSIS — R10.84 GENERALIZED ABDOMINAL PAIN: Primary | ICD-10-CM

## 2022-06-29 DIAGNOSIS — K59.03 DRUG-INDUCED CONSTIPATION: ICD-10-CM

## 2022-06-29 PROCEDURE — 1123F ACP DISCUSS/DSCN MKR DOCD: CPT | Performed by: STUDENT IN AN ORGANIZED HEALTH CARE EDUCATION/TRAINING PROGRAM

## 2022-06-29 PROCEDURE — 99214 OFFICE O/P EST MOD 30 MIN: CPT | Performed by: STUDENT IN AN ORGANIZED HEALTH CARE EDUCATION/TRAINING PROGRAM

## 2022-06-29 ASSESSMENT — ENCOUNTER SYMPTOMS
DIARRHEA: 0
NAUSEA: 0
WHEEZING: 0
BLOOD IN STOOL: 0
ABDOMINAL PAIN: 1
ANAL BLEEDING: 0
VOMITING: 0
CONSTIPATION: 1
SORE THROAT: 0
COUGH: 0
ABDOMINAL DISTENTION: 0
SINUS PAIN: 0

## 2022-06-29 NOTE — PATIENT INSTRUCTIONS
Champ Amel you for coming to see me today! 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 Here's what I would recommend we do:    STOP using the generic Ex-Lax  Keep using Colace daily  START using Miralax once a day for as long as you need. Add a fiber supplement (benefiber or metamucil) once per day    We also discussed using an equal part mix of prune juice, mineral oil, and milk of magnesia (1 oz each) once per day to help with facilitating bowel movements. Come back in January for your annual well visit. Please review the documents I'm attaching related to what we discussed today. Please give me a call or message me on [employer] if you have any problems or questions, and I will see you at your next visit. Dr. Sailaja Newton:    Fer Spear may be receiving a survey from [employer] regarding your visit today. Please complete the survey to enable us to provide the highest quality of care to you and your family. If you cannot score us a very good on any question, please call the office to discuss how we could of made your experience a very good one. Thank you.       Clinical Care Team:     Dr. Jose R Morrison, BRANDON      ClericalTeam:     [id number] [facility]

## 2022-06-29 NOTE — PROGRESS NOTES
HPI Notes    Name: Toña De La O  : 1940         Chief Complaint:     Chief Complaint   Patient presents with    Constipation     ABD pain . Constipation on and off over the yearsworse when he travels states he us unable to drin 16 glasses of water , states last BM was tuesday night has been having issues the last 2 weeks        History of Present Illness:      HPI    This is a very nice [de-identified]year old man presenting to discuss generalized abdominal pain which he believes may be d/t constipation. He reports he is drinking an excellent amount of water; 12-16 glasses a day. He is reasonably active as well. His last BM was Tuesday evening after using his colace along with sennoside laxative. 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 He is wishing to discuss a better bowel regimen. Past Medical History:     Past Medical History:   Diagnosis Date    GERD (gastroesophageal reflux disease)     Hyperlipidemia       Reviewed all health maintenance requirements and ordered appropriate tests  There are no preventive care reminders to display for this patient. Past Surgical History:     Past Surgical History:   Procedure Laterality Date    COLONOSCOPY      polyp    COLONOSCOPY  2016    sigmoid diverticula    CYSTOSCOPY      [facility]-     INTRACAPSULAR CATARACT EXTRACTION      [facility] Orell[facility]- Dr. Albina Taylor (15 years ago)    TONSILLECTOMY          Medications:       Prior to Admission medications    Medication Sig Start Date End Date Taking?  Authorizing Provider   tamsulosin (FLOMAX) 0.4 MG capsule Take 1 capsule by mouth every evening 22  Yes Valentino Medina PA-C   levocetirizine (XYZAL) 5 MG tablet Take 1 tablet by mouth nightly 22  Yes Randell Cerrato MD   ezetimibe (ZETIA) 10 MG tablet Take 1 tablet by mouth daily 22  Yes Randell Cerrato MD   docusate sodium (COLACE) 100 MG capsule Take 1 capsule by mouth daily 3/10/22  Yes Valentino Medina PA-C   calcium carbonate (OSCAL) 500 MG TABS tablet Take 500 mg by mouth daily Two tablet a day   Yes Historical Provider, MD   fluticasone (FLONASE) 50 MCG/ACT nasal spray 2 sprays by Each Nostril route daily  Patient not taking: Reported on 6/29/2022 2/24/20   Dorita Clark MD        Allergies:       Seasonal    Social History:     Tobacco:    reports that he quit smoking about 52 years ago. His smoking use included cigarettes. He has a 2.50 pack-year smoking history. He has never used smokeless tobacco.  Alcohol:      reports current alcohol use of about 1.0 standard drink of alcohol per week. Drug Use:  reports no history of drug use. Family History:     Family History   Problem Relation Age of Onset   Jean Pierre Rudder Parkinsonism Mother     Heart Disease Father     Crohn's Disease Sister        Review of Systems:         Review of Systems   Constitutional: Negative for fever. HENT: Negative for sinus pain, sneezing and sore throat. Respiratory: Negative for cough and wheezing. Cardiovascular: Negative for chest pain. Gastrointestinal: Positive for abdominal pain and constipation. Negative for abdominal distention, anal bleeding, blood in stool, diarrhea, nausea and vomiting. Psychiatric/Behavioral: Negative for sleep disturbance. Physical Exam:     Vitals:  /70 (Site: Left Upper Arm, Position: Sitting, Cuff Size: Medium Adult)   Pulse 64   Resp 20   Ht 5' 8\" (1.727 m)   Wt 150 lb 6.4 oz (68.2 kg)   SpO2 99%   BMI 22.87 kg/m²       Physical Exam  Vitals and nursing note reviewed. Constitutional:       General: He is not in acute distress. Appearance: Normal appearance. He is normal weight. Abdominal:      General: Abdomen is flat. Bowel sounds are normal. There is no distension. Palpations: Abdomen is soft. Tenderness: There is no abdominal tenderness. Hernia: No hernia is present.    Musculoskeletal:         General: No swelling or tenderness. Normal range of motion. Skin:     General: Skin is warm and dry. Capillary Refill: Capillary refill takes less than 2 seconds. Neurological:      General: No focal deficit present. Mental Status: He is alert and oriented to person, place, and time. Mental status is at baseline. Psychiatric:         Mood and Affect: Mood normal.         Behavior: Behavior normal.         Thought Content: Thought content normal.                 Data:     Lab Results   Component Value Date     01/24/2022    K 4.6 01/24/2022     01/24/2022    CO2 25 01/24/2022    BUN 15 01/24/2022    CREATININE 1.02 01/24/2022    GLUCOSE 106 01/24/2022    PROT 6.7 01/24/2022    LABALBU 4.1 01/24/2022    BILITOT 0.46 01/24/2022    ALKPHOS 67 01/24/2022    AST 20 01/24/2022    ALT 15 01/24/2022     Lab Results   Component Value Date    WBC 5.0 11/30/2017    RBC 5.07 11/30/2017    HGB 15.6 11/30/2017    HCT 46.4 11/30/2017    MCV 91.5 11/30/2017    MCH 30.7 11/30/2017    MCHC 33.5 11/30/2017    RDW 12.5 01/13/2014     11/30/2017    MPV NOT REPORTED 01/13/2014     Lab Results   Component Value Date    TSH 2.99 11/21/2019     Lab Results   Component Value Date    CHOL 189 01/24/2022    CHOL 233 11/21/2019    HDL 48 01/24/2022    PSA 2.57 01/24/2022    LABA1C 5.6 01/19/2021          Assessment & Plan        Diagnosis Orders   1. Generalized abdominal pain     2. Drug-induced constipation       1., 2. Based on his history and physical exam, I am not concerned for a bowel obstruction at this time and would not recommend advanced imaging or laboratory work. I believe part of his concerns are d/t overuse or prolonged use of sennoside laxatives; this may have caused him to develop some dependence on this, further contributing to his bowel irregularity and constipation. I would recommend he stop using sennoside laxatives and continue using Colace PRN as well as continue adequate hydration.  I also recommend that he add Miralax once per day, as this is not habit forming. He may also use a fiber supplement daily to further bulk up the stool. We also discussed using an equal part mixture of mineral oil, prune juice, milk of magnesia PRN for additional relief. Follow up in 4 weeks if not improved. Completed Refills   Requested Prescriptions      No prescriptions requested or ordered in this encounter     Return in about 7 months (around 1/24/2023) for Medicare Well Visit. No orders of the defined types were placed in this encounter. No orders of the defined types were placed in this encounter. Patient Instructions   Nasrin Gaitan,    Thank you for coming to see me today! 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 Here's what I would recommend we do:    STOP using the generic Ex-Lax  Keep using Colace daily  START using Miralax once a day for as long as you need. Add a fiber supplement (benefiber or metamucil) once per day    We also discussed using an equal part mix of prune juice, mineral oil, and milk of magnesia (1 oz each) once per day to help with facilitating bowel movements. Come back in January for your annual well visit. Please review the documents I'm attaching related to what we discussed today. Please give me a call or message me on [employer] if you have any problems or questions, and I will see you at your next visit. Dr. Carlos Cabello:    Walter Rubio may be receiving a survey from [employer] regarding your visit today. Please complete the survey to enable us to provide the highest quality of care to you and your family. If you cannot score us a very good on any question, please call the office to discuss how we could of made your experience a very good one. Thank you.       Clinical Care Team:     Dr. Yola Abarca, BRANDON Corona:     Marcia Hoyt      Electronically signed by Reinaldo Jackson DO on 6/29/2022 at 8:30 AM           Completed Refills   Requested Prescriptions      No prescriptions requested or ordered in this encounter

## 2022-10-31 ENCOUNTER — OFFICE VISIT (OUTPATIENT)
Dept: FAMILY MEDICINE CLINIC | Age: 82
End: 2022-10-31
Payer: MEDICARE

## 2022-10-31 VITALS
DIASTOLIC BLOOD PRESSURE: 60 MMHG | SYSTOLIC BLOOD PRESSURE: 100 MMHG | OXYGEN SATURATION: 98 % | RESPIRATION RATE: 20 BRPM | BODY MASS INDEX: 22.64 KG/M2 | HEIGHT: 68 IN | HEART RATE: 64 BPM | WEIGHT: 149.4 LBS

## 2022-10-31 DIAGNOSIS — J34.89 SINUS PRESSURE: ICD-10-CM

## 2022-10-31 DIAGNOSIS — N40.1 BPH WITH OBSTRUCTION/LOWER URINARY TRACT SYMPTOMS: ICD-10-CM

## 2022-10-31 DIAGNOSIS — R35.1 NOCTURIA: ICD-10-CM

## 2022-10-31 DIAGNOSIS — Z23 NEED FOR VACCINATION: ICD-10-CM

## 2022-10-31 DIAGNOSIS — R05.9 COUGH IN ADULT: Primary | ICD-10-CM

## 2022-10-31 DIAGNOSIS — N13.8 BPH WITH OBSTRUCTION/LOWER URINARY TRACT SYMPTOMS: ICD-10-CM

## 2022-10-31 DIAGNOSIS — R09.81 NASAL CONGESTION: ICD-10-CM

## 2022-10-31 DIAGNOSIS — J01.00 ACUTE NON-RECURRENT MAXILLARY SINUSITIS: ICD-10-CM

## 2022-10-31 PROCEDURE — 90694 VACC AIIV4 NO PRSRV 0.5ML IM: CPT | Performed by: STUDENT IN AN ORGANIZED HEALTH CARE EDUCATION/TRAINING PROGRAM

## 2022-10-31 PROCEDURE — 99213 OFFICE O/P EST LOW 20 MIN: CPT | Performed by: STUDENT IN AN ORGANIZED HEALTH CARE EDUCATION/TRAINING PROGRAM

## 2022-10-31 PROCEDURE — G0008 ADMIN INFLUENZA VIRUS VAC: HCPCS | Performed by: STUDENT IN AN ORGANIZED HEALTH CARE EDUCATION/TRAINING PROGRAM

## 2022-10-31 PROCEDURE — 1123F ACP DISCUSS/DSCN MKR DOCD: CPT | Performed by: STUDENT IN AN ORGANIZED HEALTH CARE EDUCATION/TRAINING PROGRAM

## 2022-10-31 RX ORDER — TAMSULOSIN HYDROCHLORIDE 0.4 MG/1
0.4 CAPSULE ORAL EVERY EVENING
Qty: 90 CAPSULE | Refills: 3 | Status: SHIPPED | OUTPATIENT
Start: 2022-10-31

## 2022-10-31 RX ORDER — AZITHROMYCIN 250 MG/1
250 TABLET, FILM COATED ORAL SEE ADMIN INSTRUCTIONS
Qty: 6 TABLET | Refills: 0 | Status: SHIPPED | OUTPATIENT
Start: 2022-10-31 | End: 2022-11-05

## 2022-10-31 ASSESSMENT — ENCOUNTER SYMPTOMS
WHEEZING: 0
DIARRHEA: 0
SHORTNESS OF BREATH: 0
COUGH: 1
SINUS PRESSURE: 1
FACIAL SWELLING: 0
SINUS PAIN: 1
NAUSEA: 0
ABDOMINAL PAIN: 0
SORE THROAT: 0
BACK PAIN: 0
VOMITING: 0

## 2022-10-31 NOTE — PROGRESS NOTES
HPI Notes    Name: Elijah Santizo  : 1940         Chief Complaint:     Chief Complaint   Patient presents with    Cough     Onset a little more than a week  has been taking sudafed and delsym as needed     Sinus Problem       History of Present Illness:      HPI    This is an [de-identified]year old man presenting for evaluation of cough ongoing for about eleven days. He has been utilizing pseudoephedrine and Delsym PRN, which have been only somewhat helpful. He is endorsing sinus congestion, nasal congestion, nonproductive cough, he however denies fever, tooth pain, bloody nasal discharge, hemoptysis, SOA. Of note, he is also desiring a refill of his Flomax, which is working well for him. He is also desiring to update his flu vaccine today. Past Medical History:     Past Medical History:   Diagnosis Date    GERD (gastroesophageal reflux disease)     Hyperlipidemia       Reviewed all health maintenance requirements and ordered appropriate tests  Health Maintenance Due   Topic Date Due    Flu vaccine (1) 2022       Past Surgical History:     Past Surgical History:   Procedure Laterality Date    COLONOSCOPY      polyp    COLONOSCOPY  2016    sigmoid diverticula    CYSTOSCOPY      [facility]-     INTRACAPSULAR CATARACT EXTRACTION      Bilat    PROSTATE SURGERY      [facility]- Dr. Susanne Hardy (15 years ago)    TONSILLECTOMY          Medications:       Prior to Admission medications    Medication Sig Start Date End Date Taking?  Authorizing Provider   tamsulosin (FLOMAX) 0.4 MG capsule Take 1 capsule by mouth every evening 10/31/22  Yes Ivan Richardson,    azithromycin (ZITHROMAX) 250 MG tablet Take 1 tablet by mouth See Admin Instructions for 5 days 500mg on day 1 followed by 250mg on days 2 - 5 10/31/22 11/5/22 Yes Fan Horne,    levocetirizine (XYZAL) 5 MG tablet Take 1 tablet by mouth nightly 22  Yes Reuben Hancock MD   ezetimibe (ZETIA) 10 MG tablet Take 1 tablet by mouth daily 4/11/22  Yes Shannon Menjivar MD   docusate sodium (COLACE) 100 MG capsule Take 1 capsule by mouth daily 3/10/22  Yes Valentino Medina PA-C   fluticasone [facility]) 50 MCG/ACT nasal spray 2 sprays by Each Nostril route daily 2/24/20  Yes Shannon Menjivar MD   calcium carbonate (OSCAL) 500 MG TABS tablet Take 500 mg by mouth daily Two tablet a day   Yes Historical Provider, MD        Allergies:       Seasonal    Social History:     Tobacco:    reports that he quit smoking about 52 years ago. His smoking use included cigarettes. He has a 2.50 pack-year smoking history. He has never used smokeless tobacco.  Alcohol:      reports current alcohol use of about 1.0 standard drink per week. Drug Use:  reports no history of drug use. Family History:     Family History   Problem Relation Age of Onset    Parkinsonism Mother     Heart Disease Father     Crohn's Disease Sister        Review of Systems:       Review of Systems   Constitutional:  Negative for fever. HENT:  Positive for congestion, sinus pressure and sinus pain. Negative for dental problem, ear discharge, ear pain, facial swelling, postnasal drip, sneezing and sore throat. Respiratory:  Positive for cough. Negative for shortness of breath and wheezing. Cardiovascular:  Negative for chest pain. Gastrointestinal:  Negative for abdominal pain, diarrhea, nausea and vomiting. Musculoskeletal:  Negative for back pain. Skin:  Negative for rash. Hematological:  Negative for adenopathy. Psychiatric/Behavioral:  Negative for sleep disturbance. Physical Exam:     Vitals:  /60 (Site: Right Upper Arm, Position: Sitting, Cuff Size: Medium Adult)   Pulse 64   Resp 20   Ht 5' 8\" (1.727 m)   Wt 149 lb 6.4 oz (67.8 kg)   SpO2 98%   BMI 22.72 kg/m²       Physical Exam  Vitals and nursing note reviewed. Constitutional:       General: He is not in acute distress. Appearance: Normal appearance. He is normal weight.    HENT:      Head: Comments: Tap tenderness to maxillary sinuses bilaterally     Right Ear: Tympanic membrane, ear canal and external ear normal. There is no impacted cerumen. Left Ear: Ear canal and external ear normal. There is no impacted cerumen. Nose: Congestion present. Mouth/Throat:      Mouth: Mucous membranes are moist.      Pharynx: Oropharynx is clear. No oropharyngeal exudate. Cardiovascular:      Rate and Rhythm: Normal rate and regular rhythm. Pulses: Normal pulses. Heart sounds: Normal heart sounds. No murmur heard. Pulmonary:      Effort: Pulmonary effort is normal. No respiratory distress. Breath sounds: Normal breath sounds. No wheezing or rhonchi. Musculoskeletal:         General: No swelling or tenderness. Normal range of motion. Skin:     General: Skin is warm and dry. Capillary Refill: Capillary refill takes less than 2 seconds. Neurological:      General: No focal deficit present. Mental Status: He is alert and oriented to person, place, and time. Mental status is at baseline. Psychiatric:         Mood and Affect: Mood normal.         Behavior: Behavior normal.         Thought Content:  Thought content normal.               Data:     Lab Results   Component Value Date/Time     01/24/2022 08:07 AM    K 4.6 01/24/2022 08:07 AM     01/24/2022 08:07 AM    CO2 25 01/24/2022 08:07 AM    BUN 15 01/24/2022 08:07 AM    CREATININE 1.02 01/24/2022 08:07 AM    GLUCOSE 106 01/24/2022 08:07 AM    PROT 6.7 01/24/2022 08:07 AM    LABALBU 4.1 01/24/2022 08:07 AM    BILITOT 0.46 01/24/2022 08:07 AM    ALKPHOS 67 01/24/2022 08:07 AM    AST 20 01/24/2022 08:07 AM    ALT 15 01/24/2022 08:07 AM     Lab Results   Component Value Date/Time    WBC 5.0 11/30/2017 12:00 AM    RBC 5.07 11/30/2017 12:00 AM    HGB 15.6 11/30/2017 12:00 AM    HCT 46.4 11/30/2017 12:00 AM    MCV 91.5 11/30/2017 12:00 AM    MCH 30.7 11/30/2017 12:00 AM    MCHC 33.5 11/30/2017 12:00 AM    RDW 12.5 01/13/2014 10:04 AM     11/30/2017 12:00 AM    MPV NOT REPORTED 01/13/2014 10:04 AM     Lab Results   Component Value Date/Time    TSH 2.99 11/21/2019 12:00 AM     Lab Results   Component Value Date/Time    CHOL 189 01/24/2022 08:07 AM    CHOL 233 11/21/2019 12:00 AM    HDL 48 01/24/2022 08:07 AM    PSA 2.57 01/24/2022 08:07 AM    LABA1C 5.6 01/19/2021 10:07 AM          Assessment & Plan        Diagnosis Orders   1. Cough in adult  azithromycin (ZITHROMAX) 250 MG tablet      2. Nasal congestion  azithromycin (ZITHROMAX) 250 MG tablet      3. Sinus pressure  azithromycin (ZITHROMAX) 250 MG tablet      4. Acute non-recurrent maxillary sinusitis  azithromycin (ZITHROMAX) 250 MG tablet      5. BPH with obstruction/lower urinary tract symptoms  tamsulosin (FLOMAX) 0.4 MG capsule      6. Nocturia  tamsulosin (FLOMAX) 0.4 MG capsule      7. Need for vaccination  Influenza, FLUAD, (age 72 y+), IM, Preservative Free, 0.5 mL          1.--4. I suspect acute maxillary sinusitis since this has been ongoing for more than 10 days; he may continue Delsym and Sudafed; would recommend he add Zinc tablets as well. I would recommend he utilize a brief course of azithromycin as well. The patient I had a long discussion about starting azithromycin. We discussed its mechanism of action, intended goals, adverse effects, as well as common side effects. They were able to verbalize understanding, and repeat plan back to me. 5., 6. Will refill Flomax    7. Will order influenza vaccine today. Follow up as needed prior to next OV. Completed Refills   Requested Prescriptions     Signed Prescriptions Disp Refills    tamsulosin (FLOMAX) 0.4 MG capsule 90 capsule 3     Sig: Take 1 capsule by mouth every evening    azithromycin (ZITHROMAX) 250 MG tablet 6 tablet 0     Sig: Take 1 tablet by mouth See Admin Instructions for 5 days 500mg on day 1 followed by 250mg on days 2 - 5     No follow-ups on file.   Orders Placed This Encounter   Medications    tamsulosin (FLOMAX) 0.4 MG capsule     Sig: Take 1 capsule by mouth every evening     Dispense:  90 capsule     Refill:  3    azithromycin (ZITHROMAX) 250 MG tablet     Sig: Take 1 tablet by mouth See Admin Instructions for 5 days 500mg on day 1 followed by 250mg on days 2 - 5     Dispense:  6 tablet     Refill:  0     Orders Placed This Encounter   Procedures    Influenza, FLUAD, (age 72 y+), IM, Preservative Free, 0.5 mL         Patient Instructions     SURVEY:    You may be receiving a survey from [employer] regarding your visit today. Please complete the survey to enable us to provide the highest quality of care to you and your family. If you cannot score us a very good on any question, please call the office to discuss how we could of made your experience a very good one. Thank you.       Clinical Care Team:     Dr. Mirian Cahcon, [facility]      ClericalTeam:     Javier Rutledge   Electronically signed by Abdi Strong DO on 10/31/2022 at 9:21 AM           Completed Refills   Requested Prescriptions     Signed Prescriptions Disp Refills    tamsulosin (FLOMAX) 0.4 MG capsule 90 capsule 3     Sig: Take 1 capsule by mouth every evening    azithromycin (ZITHROMAX) 250 MG tablet 6 tablet 0     Sig: Take 1 tablet by mouth See Admin Instructions for 5 days 500mg on day 1 followed by 250mg on days 2 - 5

## 2022-10-31 NOTE — PATIENT INSTRUCTIONS
SURVEY:    You may be receiving a survey from [employer] regarding your visit today. Please complete the survey to enable us to provide the highest quality of care to you and your family. If you cannot score us a very good on any question, please call the office to discuss how we could of made your experience a very good one. Thank you.       Clinical Care Team:     BRANDON Barron      ClericalTeam:     [id number] [facility]

## 2022-11-14 ENCOUNTER — HOSPITAL ENCOUNTER (OUTPATIENT)
Dept: ULTRASOUND IMAGING | Age: 82
Discharge: HOME | End: 2022-11-16
Payer: MEDICARE

## 2022-11-14 DIAGNOSIS — N50.3 EPIDIDYMAL CYST: ICD-10-CM

## 2022-11-14 PROCEDURE — 93976 VASCULAR STUDY: CPT

## 2022-11-17 ENCOUNTER — OFFICE VISIT (OUTPATIENT)
Dept: UROLOGY | Age: 82
End: 2022-11-17
Payer: MEDICARE

## 2022-11-17 VITALS
SYSTOLIC BLOOD PRESSURE: 124 MMHG | BODY MASS INDEX: 23.23 KG/M2 | HEIGHT: 67 IN | DIASTOLIC BLOOD PRESSURE: 72 MMHG | WEIGHT: 148 LBS

## 2022-11-17 DIAGNOSIS — R35.1 NOCTURIA: ICD-10-CM

## 2022-11-17 DIAGNOSIS — N13.8 BPH WITH OBSTRUCTION/LOWER URINARY TRACT SYMPTOMS: Primary | ICD-10-CM

## 2022-11-17 DIAGNOSIS — N40.1 BPH WITH OBSTRUCTION/LOWER URINARY TRACT SYMPTOMS: Primary | ICD-10-CM

## 2022-11-17 DIAGNOSIS — N50.3 EPIDIDYMAL CYST: ICD-10-CM

## 2022-11-17 PROCEDURE — 1123F ACP DISCUSS/DSCN MKR DOCD: CPT | Performed by: PHYSICIAN ASSISTANT

## 2022-11-17 PROCEDURE — 51798 US URINE CAPACITY MEASURE: CPT | Performed by: PHYSICIAN ASSISTANT

## 2022-11-17 PROCEDURE — 99213 OFFICE O/P EST LOW 20 MIN: CPT | Performed by: PHYSICIAN ASSISTANT

## 2022-11-17 RX ORDER — POLYETHYLENE GLYCOL 3350 17 G/17G
17 POWDER, FOR SOLUTION ORAL DAILY PRN
COMMUNITY

## 2022-11-17 ASSESSMENT — ENCOUNTER SYMPTOMS
CONSTIPATION: 0
SHORTNESS OF BREATH: 0
COUGH: 0
COLOR CHANGE: 0
EYE REDNESS: 0
NAUSEA: 0
WHEEZING: 0
ABDOMINAL PAIN: 0
BACK PAIN: 0
VOMITING: 0

## 2022-11-17 NOTE — PATIENT INSTRUCTIONS
SURVEY:    You may be receiving a survey from [employer] regarding your visit today. Please complete the survey to enable us to provide the highest quality of care to you and your family. If you cannot score us a very good on any question, please call the office to discuss how we could have made your experience a very good one. Thank you.

## 2022-11-17 NOTE — PROGRESS NOTES
HPI:      Patient is a [de-identified] y.o. male in no acute distress. He is alert and oriented to person, place, and time. History  ~15 years ago TURP - Dr. Kyleigh Hudson     12/2017 -  right scrotal pain and swelling. Patient states he first noticed symptoms 3-4 months ago. He describes his pain as a mild discomfort. He states that since the onset of his symptoms the pain or swelling has not become any worse. Dr. Soumya Hicks did order a scrotal ultrasound on 12/11/17. This was independently reviewed at today's visit and show multiple right epididymal cyst and a right extratesticular cyst.  There is no hydroceles bilaterally. Patient denies any known trauma to the area. He denies perineal or penile pain. He denies dysuria, gross hematuria, frequency, urgency, or incontinence. He has never seen urology in the past.     Follow up as needed     Today:  Patient is here today for follow-up epididymal cyst, nocturia, constipation, BPH. Patient continues to take Flomax. Patient had a previous hypoechoic nodule of his left testicle for which we are following with ultrasound. Patient did have a scrotal ultrasound prior to the visit today which is independently reviewed showing multiple right epididymal cyst with largest measuring 8.1 cm. On radiology read there is a inhomogeneity of echotexture left testicle which is stable compared to 6 months ago. Radiology also stated \"this is quite unlikely to represent neoplasm\" Random bladderscan performed in office today:  Pt last voided 60 mins ago, scan = 152 mL.      Past Medical History:   Diagnosis Date    GERD (gastroesophageal reflux disease)     Hyperlipidemia      Past Surgical History:   Procedure Laterality Date    COLONOSCOPY  2006    polyp    COLONOSCOPY  01/04/2016    sigmoid diverticula    CYSTOSCOPY      [facility]-     INTRACAPSULAR CATARACT EXTRACTION      Bilat    PROSTATE SURGERY      [facility]- Dr. Susanne Hardy (15 years ago)    TONSILLECTOMY       Outpatient Encounter Medications as of 2022   Medication Sig Dispense Refill    polyethylene glycol (GLYCOLAX) 17 g packet Take 17 g by mouth daily as needed for Constipation      tamsulosin (FLOMAX) 0.4 MG capsule Take 1 capsule by mouth every evening 90 capsule 3    levocetirizine (XYZAL) 5 MG tablet Take 1 tablet by mouth nightly 90 tablet 3    ezetimibe (ZETIA) 10 MG tablet Take 1 tablet by mouth daily 90 tablet 1    docusate sodium (COLACE) 100 MG capsule Take 1 capsule by mouth daily 30 capsule 5    fluticasone (FLONASE) 50 MCG/ACT nasal spray 2 sprays by Each Nostril route daily 1 Bottle 0    calcium carbonate (OSCAL) 500 MG TABS tablet Take 500 mg by mouth daily Two tablet a day       No facility-administered encounter medications on file as of 2022. Current Outpatient Medications on File Prior to Visit   Medication Sig Dispense Refill    polyethylene glycol (GLYCOLAX) 17 g packet Take 17 g by mouth daily as needed for Constipation      tamsulosin (FLOMAX) 0.4 MG capsule Take 1 capsule by mouth every evening 90 capsule 3    levocetirizine (XYZAL) 5 MG tablet Take 1 tablet by mouth nightly 90 tablet 3    ezetimibe (ZETIA) 10 MG tablet Take 1 tablet by mouth daily 90 tablet 1    docusate sodium (COLACE) 100 MG capsule Take 1 capsule by mouth daily 30 capsule 5    fluticasone (FLONASE) 50 MCG/ACT nasal spray 2 sprays by Each Nostril route daily 1 Bottle 0    calcium carbonate (OSCAL) 500 MG TABS tablet Take 500 mg by mouth daily Two tablet a day       No current facility-administered medications on file prior to visit.      Seasonal  Family History   Problem Relation Age of Onset    Parkinsonism Mother     Heart Disease Father     Crohn's Disease Sister      Social History     Tobacco Use   Smoking Status Former    Packs/day: 0.25    Years: 10.00    Pack years: 2.50    Types: Cigarettes    Quit date: 1970    Years since quittin.9   Smokeless Tobacco Never       Social History     Substance and Sexual Activity   Alcohol Use Yes    Alcohol/week: 1.0 standard drink    Types: 1 Glasses of wine per week    Comment: rare       Review of Systems   Constitutional:  Negative for appetite change, chills and fever. Eyes:  Negative for redness and visual disturbance. Respiratory:  Negative for cough, shortness of breath and wheezing. Cardiovascular:  Negative for chest pain and leg swelling. Gastrointestinal:  Negative for abdominal pain, constipation, nausea and vomiting. Genitourinary:  Negative for decreased urine volume, difficulty urinating, dysuria, enuresis, flank pain, frequency, hematuria, penile discharge, penile pain, scrotal swelling, testicular pain and urgency. Positive for nocturia   Musculoskeletal:  Negative for back pain, joint swelling and myalgias. Skin:  Negative for color change, rash and wound. Neurological:  Negative for dizziness, tremors and numbness. Hematological:  Negative for adenopathy. Does not bruise/bleed easily. /72   Ht 5' 7\" (1.702 m)   Wt 148 lb (67.1 kg)   BMI 23.18 kg/m²       PHYSICAL EXAM:  Constitutional: Patient in no acute distress; Neuro: alert and oriented to person place and time. Psych: Mood and affect normal.  Lungs: Respiratory effort normal  Abdomen: Soft, non-tender, non-distended  Rectal: deferred       Lab Results   Component Value Date    BUN 15 01/24/2022     Lab Results   Component Value Date    CREATININE 1.02 01/24/2022     Lab Results   Component Value Date    PSA 2.57 01/24/2022    PSA 2.89 01/19/2021    PSA 3.68 11/21/2019       ASSESSMENT:   Diagnosis Orders   1. BPH with obstruction/lower urinary tract symptoms  OH MEASUREMENT,POST-VOID RESIDUAL VOLUME BY US,NON-IMAGING      2. Nocturia  OH MEASUREMENT,POST-VOID RESIDUAL VOLUME BY US,NON-IMAGING      3.  Epididymal cyst  US SCROTUM W LIMITED DUPLEX            PLAN:  Discussed case with Dr. Reta Galloway    Scrotal ultrasound 1 year    Continue Flomax    Follow-up in 1 year with scrotal ultrasound and PVR

## 2023-01-10 DIAGNOSIS — E78.00 PURE HYPERCHOLESTEROLEMIA: ICD-10-CM

## 2023-01-10 NOTE — TELEPHONE ENCOUNTER
Last OV: 10/31/2022   01/24/22 AWV   Last RX:    Next scheduled apt: 1/26/2023  AWV        Pt requesting a refill

## 2023-01-11 RX ORDER — EZETIMIBE 10 MG/1
10 TABLET ORAL DAILY
Qty: 90 TABLET | Refills: 1 | Status: SHIPPED | OUTPATIENT
Start: 2023-01-11

## 2023-01-26 ENCOUNTER — HOSPITAL ENCOUNTER (OUTPATIENT)
Age: 83
Discharge: HOME | End: 2023-01-26
Payer: MEDICARE

## 2023-01-26 ENCOUNTER — OFFICE VISIT (OUTPATIENT)
Dept: FAMILY MEDICINE CLINIC | Age: 83
End: 2023-01-26

## 2023-01-26 VITALS
OXYGEN SATURATION: 100 % | RESPIRATION RATE: 20 BRPM | DIASTOLIC BLOOD PRESSURE: 60 MMHG | BODY MASS INDEX: 22.25 KG/M2 | HEART RATE: 62 BPM | WEIGHT: 150.2 LBS | SYSTOLIC BLOOD PRESSURE: 110 MMHG | HEIGHT: 69 IN

## 2023-01-26 DIAGNOSIS — Z13.31 SCREENING FOR DEPRESSION: ICD-10-CM

## 2023-01-26 DIAGNOSIS — Z86.010 HISTORY OF COLON POLYPS: ICD-10-CM

## 2023-01-26 DIAGNOSIS — J30.2 SEASONAL ALLERGIES: ICD-10-CM

## 2023-01-26 DIAGNOSIS — R73.09 OTHER ABNORMAL GLUCOSE: ICD-10-CM

## 2023-01-26 DIAGNOSIS — Z00.00 MEDICARE ANNUAL WELLNESS VISIT, SUBSEQUENT: Primary | ICD-10-CM

## 2023-01-26 DIAGNOSIS — K64.8 OTHER HEMORRHOIDS: ICD-10-CM

## 2023-01-26 DIAGNOSIS — N50.82 SCROTAL PAIN: ICD-10-CM

## 2023-01-26 DIAGNOSIS — Z12.5 SCREENING FOR PROSTATE CANCER: ICD-10-CM

## 2023-01-26 DIAGNOSIS — E78.00 PURE HYPERCHOLESTEROLEMIA: ICD-10-CM

## 2023-01-26 DIAGNOSIS — N50.3 EPIDIDYMAL CYST: ICD-10-CM

## 2023-01-26 DIAGNOSIS — K21.9 GASTROESOPHAGEAL REFLUX DISEASE WITHOUT ESOPHAGITIS: ICD-10-CM

## 2023-01-26 LAB
ABSOLUTE EOS #: 0.1 K/UL (ref 0–0.4)
ABSOLUTE LYMPH #: 0.7 K/UL (ref 1–4.8)
ABSOLUTE MONO #: 0.3 K/UL (ref 0–1)
ANION GAP SERPL CALCULATED.3IONS-SCNC: 8 MMOL/L (ref 9–17)
BASOPHILS # BLD: 0 % (ref 0–2)
BASOPHILS ABSOLUTE: 0 K/UL (ref 0–0.2)
BUN BLDV-MCNC: 14 MG/DL (ref 8–23)
BUN/CREAT BLD: 13 (ref 9–20)
CALCIUM SERPL-MCNC: 9.1 MG/DL (ref 8.6–10.4)
CHLORIDE BLD-SCNC: 99 MMOL/L (ref 98–107)
CHOLESTEROL/HDL RATIO: 4.3
CHOLESTEROL: 180 MG/DL
CO2: 27 MMOL/L (ref 20–31)
CREAT SERPL-MCNC: 1.05 MG/DL (ref 0.7–1.2)
DIFFERENTIAL TYPE: YES
EOSINOPHILS RELATIVE PERCENT: 2 % (ref 0–5)
ESTIMATED AVERAGE GLUCOSE: 117 MG/DL
GFR SERPL CREATININE-BSD FRML MDRD: >60 ML/MIN/1.73M2
GLUCOSE BLD-MCNC: 105 MG/DL (ref 70–99)
HBA1C MFR BLD: 5.7 % (ref 4–6)
HCT VFR BLD CALC: 44.2 % (ref 41–53)
HDLC SERPL-MCNC: 42 MG/DL
HEMOGLOBIN: 15 G/DL (ref 13.5–17.5)
LDL CHOLESTEROL: 117 MG/DL (ref 0–130)
LYMPHOCYTES # BLD: 15 % (ref 13–44)
MCH RBC QN AUTO: 31.2 PG (ref 26–34)
MCHC RBC AUTO-ENTMCNC: 34 G/DL (ref 31–37)
MCV RBC AUTO: 91.9 FL (ref 80–100)
MONOCYTES # BLD: 7 % (ref 5–9)
PATIENT FASTING?: YES
PDW BLD-RTO: 13.1 % (ref 12.1–15.2)
PLATELET # BLD: 224 K/UL (ref 140–450)
POTASSIUM SERPL-SCNC: 4.1 MMOL/L (ref 3.7–5.3)
RBC # BLD: 4.81 M/UL (ref 4.5–5.9)
SEG NEUTROPHILS: 76 % (ref 39–75)
SEGMENTED NEUTROPHILS ABSOLUTE COUNT: 3.5 K/UL (ref 2.1–6.5)
SODIUM BLD-SCNC: 134 MMOL/L (ref 135–144)
TRIGL SERPL-MCNC: 105 MG/DL
WBC # BLD: 4.7 K/UL (ref 3.5–11)

## 2023-01-26 PROCEDURE — G0103 PSA SCREENING: HCPCS

## 2023-01-26 PROCEDURE — 36415 COLL VENOUS BLD VENIPUNCTURE: CPT

## 2023-01-26 PROCEDURE — 80048 BASIC METABOLIC PNL TOTAL CA: CPT

## 2023-01-26 PROCEDURE — 83036 HEMOGLOBIN GLYCOSYLATED A1C: CPT

## 2023-01-26 PROCEDURE — 80061 LIPID PANEL: CPT

## 2023-01-26 PROCEDURE — 85025 COMPLETE CBC W/AUTO DIFF WBC: CPT

## 2023-01-26 SDOH — ECONOMIC STABILITY: FOOD INSECURITY: WITHIN THE PAST 12 MONTHS, THE FOOD YOU BOUGHT JUST DIDN'T LAST AND YOU DIDN'T HAVE MONEY TO GET MORE.: NEVER TRUE

## 2023-01-26 SDOH — ECONOMIC STABILITY: FOOD INSECURITY: WITHIN THE PAST 12 MONTHS, YOU WORRIED THAT YOUR FOOD WOULD RUN OUT BEFORE YOU GOT MONEY TO BUY MORE.: NEVER TRUE

## 2023-01-26 ASSESSMENT — PATIENT HEALTH QUESTIONNAIRE - PHQ9
4. FEELING TIRED OR HAVING LITTLE ENERGY: 0
7. TROUBLE CONCENTRATING ON THINGS, SUCH AS READING THE NEWSPAPER OR WATCHING TELEVISION: 0
9. THOUGHTS THAT YOU WOULD BE BETTER OFF DEAD, OR OF HURTING YOURSELF: 0
SUM OF ALL RESPONSES TO PHQ9 QUESTIONS 1 & 2: 0
5. POOR APPETITE OR OVEREATING: 0
8. MOVING OR SPEAKING SO SLOWLY THAT OTHER PEOPLE COULD HAVE NOTICED. OR THE OPPOSITE, BEING SO FIGETY OR RESTLESS THAT YOU HAVE BEEN MOVING AROUND A LOT MORE THAN USUAL: 0
SUM OF ALL RESPONSES TO PHQ QUESTIONS 1-9: 0
SUM OF ALL RESPONSES TO PHQ QUESTIONS 1-9: 0
10. IF YOU CHECKED OFF ANY PROBLEMS, HOW DIFFICULT HAVE THESE PROBLEMS MADE IT FOR YOU TO DO YOUR WORK, TAKE CARE OF THINGS AT HOME, OR GET ALONG WITH OTHER PEOPLE: 0
1. LITTLE INTEREST OR PLEASURE IN DOING THINGS: 0
2. FEELING DOWN, DEPRESSED OR HOPELESS: 0
3. TROUBLE FALLING OR STAYING ASLEEP: 0
SUM OF ALL RESPONSES TO PHQ QUESTIONS 1-9: 0
SUM OF ALL RESPONSES TO PHQ QUESTIONS 1-9: 0
6. FEELING BAD ABOUT YOURSELF - OR THAT YOU ARE A FAILURE OR HAVE LET YOURSELF OR YOUR FAMILY DOWN: 0

## 2023-01-26 ASSESSMENT — SOCIAL DETERMINANTS OF HEALTH (SDOH): HOW HARD IS IT FOR YOU TO PAY FOR THE VERY BASICS LIKE FOOD, HOUSING, MEDICAL CARE, AND HEATING?: NOT HARD AT ALL

## 2023-01-26 ASSESSMENT — LIFESTYLE VARIABLES
HOW OFTEN DO YOU HAVE A DRINK CONTAINING ALCOHOL: NEVER
HOW MANY STANDARD DRINKS CONTAINING ALCOHOL DO YOU HAVE ON A TYPICAL DAY: PATIENT DOES NOT DRINK

## 2023-01-26 NOTE — PROGRESS NOTES
Medicare Annual Wellness Visit    Ranjana Hamm is here for Medicare AWV    Assessment & Plan   Medicare annual wellness visit, subsequent  Other hemorrhoids  Comments:  Stable  Gastroesophageal reflux disease without esophagitis  Comments:  Stable  Epididymal cyst  Scrotal pain  Other abnormal glucose  Comments:  will evaluate with BMP today (fasting)  Orders:  -     Basic Metabolic Panel; Future  -     CBC with Auto Differential; Future  Pure hypercholesterolemia  Comments:  will obtain lipid profile today  Orders:  -     Lipid Panel; Future  Seasonal allergies  Comments:  Stable  History of colon polyps  Comments:  Patient considering referral to GI for colonoscopy  Screening for depression  Comments:  not depressed      Recommendations for Preventive Services Due: see orders and patient instructions/AVS.  Recommended screening schedule for the next 5-10 years is provided to the patient in written form: see Patient Instructions/AVS.     Return in 1 year (on 1/26/2024) for Medicare Annual Wellness Visit in 1 year. Subjective   The following acute and/or chronic problems were also addressed today:  Hemorrhoids, GERD, scrotal pain/epididymal cyst, Hx colon polyps, screening for hyperlipidemia and DM2, seasonal allergic rhinitis. Patient's complete Health Risk Assessment and screening values have been reviewed and are found in Flowsheets. The following problems were reviewed today and where indicated follow up appointments were made and/or referrals ordered.     Positive Risk Factor Screenings with Interventions:                     Safety:  Do you have any tripping hazards - loose or unsecured carpets or rugs?: (!) Yes  Interventions:  I recommend moving rug out of the house               Objective   Vitals:    01/26/23 0826   BP: 110/60   Site: Right Upper Arm   Position: Sitting   Cuff Size: Medium Adult   Pulse: 62   Resp: 20   SpO2: 100%   Weight: 150 lb 3.2 oz (68.1 kg)   Height: 5' 9.4\" (1.763 m) Body mass index is 21.93 kg/m². General Appearance: alert and oriented to person, place and time, well developed and well- nourished, in no acute distress  Skin: warm and dry, no rash or erythema  Head: normocephalic and atraumatic  Eyes: pupils equal, round, and reactive to light, extraocular eye movements intact, conjunctivae normal  ENT: tympanic membrane, external ear and ear canal normal bilaterally, nose without deformity, nasal mucosa and turbinates normal without polyps  Neck: supple and non-tender without mass, no thyromegaly or thyroid nodules, no cervical lymphadenopathy  Pulmonary/Chest: clear to auscultation bilaterally- no wheezes, rales or rhonchi, normal air movement, no respiratory distress  Cardiovascular: normal rate, regular rhythm, normal S1 and S2, no murmurs, rubs, clicks, or gallops, distal pulses intact, no carotid bruits  Abdomen: soft, non-tender, non-distended, normal bowel sounds, no masses or organomegaly  Extremities: no cyanosis, clubbing or edema  Musculoskeletal: normal range of motion, no joint swelling, deformity or tenderness  Neurologic: reflexes normal and symmetric, no cranial nerve deficit, gait, coordination and speech normal       Allergies   Allergen Reactions    Seasonal      Prior to Visit Medications    Medication Sig Taking?  Authorizing Provider   ezetimibe (ZETIA) 10 MG tablet Take 1 tablet by mouth daily Yes Tisha Horne DO   polyethylene glycol (GLYCOLAX) 17 g packet Take 17 g by mouth daily as needed for Constipation Yes Historical Provider, MD   tamsulosin (FLOMAX) 0.4 MG capsule Take 1 capsule by mouth every evening Yes Tisha Horne DO   levocetirizine (XYZAL) 5 MG tablet Take 1 tablet by mouth nightly Yes Sosa Carson MD   docusate sodium (COLACE) 100 MG capsule Take 1 capsule by mouth daily Yes Judith Medina PA-C   fluticasone (FLONASE) 50 MCG/ACT nasal spray 2 sprays by Each Nostril route daily Yes Sosa Carson MD   calcium carbonate (OSCAL) 500 MG TABS tablet Take 500 mg by mouth daily Two tablet a day Yes Historical Provider, MD Bee (Including outside providers/suppliers regularly involved in providing care):   Patient Care Team:  Cecilia Cobb DO as PCP - General (Family Medicine)  Cecilia Cobb DO as PCP - [facility] Empaneled Provider

## 2023-01-26 NOTE — PATIENT INSTRUCTIONS
SURVEY:    You may be receiving a survey from [employer] regarding your visit today. Please complete the survey to enable us to provide the highest quality of care to you and your family. If you cannot score us a very good on any question, please call the office to discuss how we could of made your experience a very good one. Thank you. Clinical Care Team:     Dr. Joni Melo, BRANDON Corona:     Jacinda Diaz        Advance Directives: Care Instructions  Overview  An advance directive is a legal way to state your wishes at the end of your life. It tells your family and your doctor what to do if you can't say what you want. There are two main types of advance directives. You can change them any time your wishes change. Living will. This form tells your family and your doctor your wishes about life support and other treatment. The form is also called a declaration. Medical power of . This form lets you name a person to make treatment decisions for you when you can't speak for yourself. This person is called a health care agent (health care proxy, health care surrogate). The form is also called a durable power of  for health care. If you do not have an advance directive, decisions about your medical care may be made by a family member, or by a doctor or a  who doesn't know you. It may help to think of an advance directive as a gift to the people who care for you. If you have one, they won't have to make tough decisions by themselves. For more information, including forms for your state, see the [address] website (www.caringinfo.org/planning/advance-directives/). Follow-up care is a key part of your treatment and safety. Be sure to make and go to all appointments, and call your doctor if you are having problems. It's also a good idea to know your test results and keep a list of the medicines you take.   What should you include in an advance directive? Many states have a unique advance directive form. (It may ask you to address specific issues.) Or you might use a universal form that's approved by many states. If your form doesn't tell you what to address, it may be hard to know what to include in your advance directive. Use the questions below to help you get started. Who do you want to make decisions about your medical care if you are not able to? What life-support measures do you want if you have a serious illness that gets worse over time or can't be cured? What are you most afraid of that might happen? (Maybe you're afraid of having pain, losing your independence, or being kept alive by machines.)  Where would you prefer to die? (Your home? A hospital? A nursing home?)  Do you want to donate your organs when you die? Do you want certain Sikh practices performed before you die? When should you call for help? Be sure to contact your doctor if you have any questions. Where can you learn more? Go to http://www.de leon.com/ and enter R264 to learn more about \"Advance Directives: Care Instructions. \"  Current as of: June 16, 2022               Content Version: 13.5  © 1052-3715 Healthwise, Incorporated. Care instructions adapted under license by [facility] ([facility]). If you have questions about a medical condition or this instruction, always ask your healthcare professional. [place] [address] any warranty or liability for your use of this information. Personalized Preventive Plan for Mikie Rodriges - 1/26/2023  Medicare offers a range of preventive health benefits. Some of the tests and screenings are paid in full while other may be subject to a deductible, co-insurance, and/or copay. Some of these benefits include a comprehensive review of your medical history including lifestyle, illnesses that may run in your family, and various assessments and screenings as appropriate.     After reviewing your medical record and screening and assessments performed today your provider may have ordered immunizations, labs, imaging, and/or referrals for you. A list of these orders (if applicable) as well as your Preventive Care list are included within your After Visit Summary for your review. Other Preventive Recommendations:    A preventive eye exam performed by an eye specialist is recommended every 1-2 years to screen for glaucoma; cataracts, macular degeneration, and other eye disorders. A preventive dental visit is recommended every 6 months. Try to get at least 150 minutes of exercise per week or 10,000 steps per day on a pedometer . Order or download the FREE \"Exercise & Physical Activity: Your Everyday Guide\" from [employer]. Call 4-492.952.8857 or search [employer] online. You need 0445-9602 mg of calcium and 4437-3626 IU of vitamin D per day. It is possible to meet your calcium requirement with diet alone, but a vitamin D supplement is usually necessary to meet this goal.  When exposed to the sun, use a sunscreen that protects against both UVA and UVB radiation with an SPF of 30 or greater. Reapply every 2 to 3 hours or after sweating, drying off with a towel, or swimming. Always wear a seat belt when traveling in a car. Always wear a helmet when riding a bicycle or motorcycle.

## 2023-01-27 ENCOUNTER — TELEPHONE (OUTPATIENT)
Dept: FAMILY MEDICINE CLINIC | Age: 83
End: 2023-01-27

## 2023-01-27 DIAGNOSIS — Z12.5 SCREENING FOR PROSTATE CANCER: Primary | ICD-10-CM

## 2023-01-29 LAB — PROSTATE SPECIFIC ANTIGEN: 3.38 NG/ML

## 2023-02-13 ENCOUNTER — OFFICE VISIT (OUTPATIENT)
Dept: FAMILY MEDICINE CLINIC | Age: 83
End: 2023-02-13
Payer: MEDICARE

## 2023-02-13 VITALS
HEART RATE: 64 BPM | DIASTOLIC BLOOD PRESSURE: 60 MMHG | OXYGEN SATURATION: 96 % | WEIGHT: 149.4 LBS | RESPIRATION RATE: 20 BRPM | BODY MASS INDEX: 22.13 KG/M2 | HEIGHT: 69 IN | SYSTOLIC BLOOD PRESSURE: 110 MMHG

## 2023-02-13 DIAGNOSIS — K62.5 RECTAL BLEEDING: Primary | ICD-10-CM

## 2023-02-13 DIAGNOSIS — K60.0 ACUTE POSTERIOR ANAL FISSURE: ICD-10-CM

## 2023-02-13 DIAGNOSIS — K64.4 EXTERNAL HEMORRHOIDS: ICD-10-CM

## 2023-02-13 DIAGNOSIS — H60.591 ACUTE IRRITANT OTITIS EXTERNA, RIGHT: ICD-10-CM

## 2023-02-13 PROCEDURE — 1123F ACP DISCUSS/DSCN MKR DOCD: CPT | Performed by: STUDENT IN AN ORGANIZED HEALTH CARE EDUCATION/TRAINING PROGRAM

## 2023-02-13 PROCEDURE — 99213 OFFICE O/P EST LOW 20 MIN: CPT | Performed by: STUDENT IN AN ORGANIZED HEALTH CARE EDUCATION/TRAINING PROGRAM

## 2023-02-13 SDOH — ECONOMIC STABILITY: FOOD INSECURITY: WITHIN THE PAST 12 MONTHS, THE FOOD YOU BOUGHT JUST DIDN'T LAST AND YOU DIDN'T HAVE MONEY TO GET MORE.: NEVER TRUE

## 2023-02-13 SDOH — ECONOMIC STABILITY: FOOD INSECURITY: WITHIN THE PAST 12 MONTHS, YOU WORRIED THAT YOUR FOOD WOULD RUN OUT BEFORE YOU GOT MONEY TO BUY MORE.: NEVER TRUE

## 2023-02-13 SDOH — ECONOMIC STABILITY: INCOME INSECURITY: HOW HARD IS IT FOR YOU TO PAY FOR THE VERY BASICS LIKE FOOD, HOUSING, MEDICAL CARE, AND HEATING?: NOT HARD AT ALL

## 2023-02-13 SDOH — ECONOMIC STABILITY: HOUSING INSECURITY
IN THE LAST 12 MONTHS, WAS THERE A TIME WHEN YOU DID NOT HAVE A STEADY PLACE TO SLEEP OR SLEPT IN A SHELTER (INCLUDING NOW)?: NO

## 2023-02-13 ASSESSMENT — ENCOUNTER SYMPTOMS
SORE THROAT: 0
WHEEZING: 0
VOMITING: 0
SINUS PAIN: 0
ANAL BLEEDING: 1
BACK PAIN: 0
DIARRHEA: 0
ABDOMINAL PAIN: 0
NAUSEA: 0
CONSTIPATION: 1
COUGH: 0

## 2023-02-13 NOTE — PROGRESS NOTES
HPI Notes    Name: Elinor Franz  : 1940         Chief Complaint:     Chief Complaint   Patient presents with    Rectal Bleeding       History of Present Illness:      HPI    This is a very nice [de-identified]year old man presenting for evaluation of rectal bleeding. Of note, he does have a history of hemorrhoids. This current problem was first noticed last year at which time he briefly discussed it with Dr. Radha Concepcion. He is noticing this blood on the toilet paper and on the stool. He has been taking stool softeners and denies constipation, difficulty defecating or hard, firm stools. In fact, he indicates his stools to be type 3-4 on the [facility] Stool chart. He had more blood this morning and would like it further examined. He has been using what sounds to be preparation H, as well as hydrocortisone on a near daily basis. Additionally, he is complaining of right otalgia of a week's duration. This is worse when chewing, being painful just inferior to the pinna. It has improved somewhat over this time. Past Medical History:     Past Medical History:   Diagnosis Date    GERD (gastroesophageal reflux disease)     Hyperlipidemia       Reviewed all health maintenance requirements and ordered appropriate tests  There are no preventive care reminders to display for this patient. Past Surgical History:     Past Surgical History:   Procedure Laterality Date    COLONOSCOPY      polyp    COLONOSCOPY  2016    sigmoid diverticula    CYSTOSCOPY      NOMS-     INTRACAPSULAR CATARACT EXTRACTION      Bilat    PROSTATE SURGERY      Green light- Dr. Luberta Meigs (15 years ago)    TONSILLECTOMY          Medications:       Prior to Admission medications    Medication Sig Start Date End Date Taking? Authorizing Provider   nitroglycerin (RECTIV) 0.4 % rectal ointment Place 1 inch rectally in the morning and 1 inch in the evening.  23  Yes Kristin Lee DO   neomycin-polymyxin-hydrocortisone (CORTISPORIN) 3.5-03626-5 otic solution Place 4 drops into the right ear 3 times daily for 10 days Instill into right Ear 2/13/23 2/23/23 Yes Evan Burr, DO   ezetimibe (ZETIA) 10 MG tablet Take 1 tablet by mouth daily 1/11/23   Braulio Horne DO   polyethylene glycol (GLYCOLAX) 17 g packet Take 17 g by mouth daily as needed for Constipation    Historical Provider, MD   tamsulosin (FLOMAX) 0.4 MG capsule Take 1 capsule by mouth every evening 10/31/22   Evan Burr, DO   levocetirizine (XYZAL) 5 MG tablet Take 1 tablet by mouth nightly 4/11/22   Reina Lofton MD   docusate sodium (COLACE) 100 MG capsule Take 1 capsule by mouth daily 3/10/22   Chelsy Medina PA-C   fluticasone Clearance Bounds) 50 MCG/ACT nasal spray 2 sprays by Each Nostril route daily 2/24/20   Reina Lofton MD   calcium carbonate (OSCAL) 500 MG TABS tablet Take 500 mg by mouth daily Two tablet a day    Historical Provider, MD        Allergies:       Seasonal    Social History:     Tobacco:    reports that he quit smoking about 53 years ago. His smoking use included cigarettes. He has a 2.50 pack-year smoking history. He has never used smokeless tobacco.  Alcohol:      reports current alcohol use of about 1.0 standard drink per week. Drug Use:  reports no history of drug use. Family History:     Family History   Problem Relation Age of Onset    Parkinsonism Mother     Heart Disease Father     Crohn's Disease Sister        Review of Systems:       Review of Systems   Constitutional:  Negative for fever. HENT:  Positive for ear pain. Negative for ear discharge, sinus pain, sneezing and sore throat. Respiratory:  Negative for cough and wheezing. Cardiovascular:  Negative for chest pain. Gastrointestinal:  Positive for anal bleeding and constipation. Negative for abdominal pain, diarrhea, nausea and vomiting. Musculoskeletal:  Negative for back pain. Skin:  Negative for rash. Hematological:  Negative for adenopathy.    Psychiatric/Behavioral: Negative for sleep disturbance. Physical Exam:     Vitals:  /60 (Site: Right Upper Arm, Position: Sitting, Cuff Size: Medium Adult)   Pulse 64   Resp 20   Ht 5' 9.4\" (1.763 m)   Wt 149 lb 6.4 oz (67.8 kg)   SpO2 96%   BMI 21.81 kg/m²       Physical Exam  Vitals and nursing note reviewed. Exam conducted with a chaperone present (Elena Orozco). Constitutional:       General: He is not in acute distress. Appearance: Normal appearance. He is normal weight. HENT:      Right Ear: Tympanic membrane and external ear normal. There is no impacted cerumen. Left Ear: Tympanic membrane, ear canal and external ear normal. There is no impacted cerumen. Ears:      Comments: Right ear canal erythematous, somewhat edematous and scaly  Genitourinary:     Prostate: Enlarged. Rectum: Guaiac result negative. Anal fissure and external hemorrhoid present. No tenderness. Normal anal tone. Comments: Anal fissure at the apex of the perineal raphe, two external hemorrhoidal skin tags  Musculoskeletal:         General: No swelling or tenderness. Normal range of motion. Skin:     General: Skin is warm and dry. Capillary Refill: Capillary refill takes less than 2 seconds. Neurological:      General: No focal deficit present. Mental Status: He is alert and oriented to person, place, and time. Mental status is at baseline. Psychiatric:         Mood and Affect: Mood normal.         Behavior: Behavior normal.         Thought Content:  Thought content normal.               Data:     Lab Results   Component Value Date/Time     01/26/2023 09:15 AM    K 4.1 01/26/2023 09:15 AM    CL 99 01/26/2023 09:15 AM    CO2 27 01/26/2023 09:15 AM    BUN 14 01/26/2023 09:15 AM    CREATININE 1.05 01/26/2023 09:15 AM    GLUCOSE 105 01/26/2023 09:15 AM    PROT 6.7 01/24/2022 08:07 AM    LABALBU 4.1 01/24/2022 08:07 AM    BILITOT 0.46 01/24/2022 08:07 AM    ALKPHOS 67 01/24/2022 08:07 AM    AST 20 01/24/2022 08:07 AM    ALT 15 01/24/2022 08:07 AM     Lab Results   Component Value Date/Time    WBC 4.7 01/26/2023 09:15 AM    RBC 4.81 01/26/2023 09:15 AM    HGB 15.0 01/26/2023 09:15 AM    HCT 44.2 01/26/2023 09:15 AM    MCV 91.9 01/26/2023 09:15 AM    MCH 31.2 01/26/2023 09:15 AM    MCHC 34.0 01/26/2023 09:15 AM    RDW 13.1 01/26/2023 09:15 AM     01/26/2023 09:15 AM    MPV NOT REPORTED 01/13/2014 10:04 AM     Lab Results   Component Value Date/Time    TSH 2.99 11/21/2019 12:00 AM     Lab Results   Component Value Date/Time    CHOL 180 01/26/2023 09:15 AM    CHOL 233 11/21/2019 12:00 AM    HDL 42 01/26/2023 09:15 AM    PSA 3.38 01/26/2023 09:15 AM    LABA1C 5.7 01/26/2023 09:15 AM          Assessment & Plan        Diagnosis Orders   1. Rectal bleeding  POCT occult blood stool    nitroglycerin (RECTIV) 0.4 % rectal ointment      2. External hemorrhoids        3. Acute posterior anal fissure  nitroglycerin (RECTIV) 0.4 % rectal ointment      4. Acute irritant otitis externa, right  neomycin-polymyxin-hydrocortisone (CORTISPORIN) 3.5-08136-9 otic solution          Anal fissure readily apparent. Has typical features; will withhold GI referral for time being. Will recommend continued osmotic laxative, stool softener, fiber, and add topical nitroglycerin  The patient I had a long discussion about starting nitroglycerin ointment 0.4% BID x8 weeks. We discussed its mechanism of action, intended goals, adverse effects, as well as common side effects. They were able to verbalize understanding, and repeat plan back to me. POCT FOBT ordered; results are negative    4. Clinically evident OE; will recommend cortisporin for its glucocorticoid and PRN follow up    Follow up in eight weeks    Completed Refills   Requested Prescriptions     Signed Prescriptions Disp Refills    nitroglycerin (RECTIV) 0.4 % rectal ointment 30 g 1     Sig: Place 1 inch rectally in the morning and 1 inch in the evening. neomycin-polymyxin-hydrocortisone (CORTISPORIN) 3.5-65002-9 otic solution 10 mL 0     Sig: Place 4 drops into the right ear 3 times daily for 10 days Instill into right Ear     Return if symptoms worsen or fail to improve. Orders Placed This Encounter   Medications    nitroglycerin (RECTIV) 0.4 % rectal ointment     Sig: Place 1 inch rectally in the morning and 1 inch in the evening. Dispense:  30 g     Refill:  1    neomycin-polymyxin-hydrocortisone (CORTISPORIN) 3.5-98808-8 otic solution     Sig: Place 4 drops into the right ear 3 times daily for 10 days Instill into right Ear     Dispense:  10 mL     Refill:  0       Orders Placed This Encounter   Procedures    POCT occult blood stool     Standing Status:   Future     Standing Expiration Date:   2/13/2024         Patient Instructions     SURVEY:    You may be receiving a survey from [employer] regarding your visit today. Please complete the survey to enable us to provide the highest quality of care to you and your family. If you cannot score us a very good on any question, please call the office to discuss how we could of made your experience a very good one. Thank you. Clinical Care Team:     Dr. Vanessa Samuel, [facility]      ClericalTeam:     Shania Darden   Electronically signed by Charbel Owens DO on 2/13/2023 at 3:43 PM           Completed Refills   Requested Prescriptions     Signed Prescriptions Disp Refills    nitroglycerin (RECTIV) 0.4 % rectal ointment 30 g 1     Sig: Place 1 inch rectally in the morning and 1 inch in the evening.     neomycin-polymyxin-hydrocortisone (CORTISPORIN) 3.5-44621-4 otic solution 10 mL 0     Sig: Place 4 drops into the right ear 3 times daily for 10 days Instill into right Ear

## 2023-02-13 NOTE — PATIENT INSTRUCTIONS
SURVEY:    You may be receiving a survey from [email or web address] regarding your visit today. Please complete the survey to enable us to provide the highest quality of care to you and your family. If you cannot score us a very good on any question, please call the office to discuss how we could of made your experience a very good one. Thank you.       Clinical Care Team:     Dr. Yvette Belcher, ASHLEY      ClericalTeam:     [id number] [facility]

## 2023-02-15 ENCOUNTER — TELEPHONE (OUTPATIENT)
Dept: FAMILY MEDICINE CLINIC | Age: 83
End: 2023-02-15

## 2023-02-15 DIAGNOSIS — K60.0 ACUTE POSTERIOR ANAL FISSURE: Primary | ICD-10-CM

## 2023-02-15 NOTE — TELEPHONE ENCOUNTER
Patient states that the anal cream that was prescribed for him is around $800 for a small tube @ [facility] - they did get it down with coupons and such and paid $305 - wants to discuss with his doctor or nurse     Health Maintenance   Topic Date Due    Depression Screen  01/26/2024    Annual Wellness Visit (AWV)  01/27/2024    DTaP/Tdap/Td vaccine (3 - Td or Tdap) 12/11/2029    Flu vaccine  Completed    Shingles vaccine  Completed    Pneumococcal 65+ years Vaccine  Completed    COVID-19 Vaccine  Completed    Hepatitis A vaccine  Aged Out    Hib vaccine  Aged Out    Meningococcal (ACWY) vaccine  Aged Out             (applicable per patient's age: Cancer Screenings, Depression Screening, Fall Risk Screening, Immunizations)    Hemoglobin A1C (%)   Date Value   01/26/2023 5.7   01/19/2021 5.6   11/21/2019 6.0     LDL Cholesterol (mg/dL)   Date Value   01/26/2023 117     LDL Calculated (mg/dL)   Date Value   11/21/2019 158     AST (U/L)   Date Value   01/24/2022 20     ALT (U/L)   Date Value   01/24/2022 15     BUN (mg/dL)   Date Value   01/26/2023 14      (goal A1C is < 7)   (goal LDL is <100) need 30-50% reduction from baseline     BP Readings from Last 3 Encounters:   02/13/23 110/60   01/26/23 110/60   11/17/22 124/72    (goal /80)      All Future Testing planned in [facility]:  Lab Frequency Next Occurrence   [place] Once 11/17/2023   POCT occult blood stool Once 03/06/2023       Next Visit Date:  Future Appointments   Date Time Provider Rashmi Montemayor   5/1/2023  9:00 AM DO Thea Mcqueen [facility] [employer]   [facility]   11/30/2023  1:00 PM JUAN PABLO Dumont [facility]            Patient Active Problem List:     Pure hypercholesterolemia     Esophageal reflux     Other abnormal glucose     Hypertrophy of prostate without urinary obstruction and other lower urinary tract symptoms (LUTS)     History of colon polyps Epididymal cyst     Scrotal pain     Seasonal allergies     Other hemorrhoids

## 2023-02-15 NOTE — TELEPHONE ENCOUNTER
Spoke with [employer] the only other ointment would be   Nitro-BID 2% ointment and wasn't sure if it can be used the same.  Please advise

## 2023-02-17 NOTE — TELEPHONE ENCOUNTER
Pt notified would like RX faxed      Fax [phone] they may be closed tomorrow to call Monday morning around 9 am

## 2023-05-01 ENCOUNTER — OFFICE VISIT (OUTPATIENT)
Dept: FAMILY MEDICINE CLINIC | Age: 83
End: 2023-05-01
Payer: MEDICARE

## 2023-05-01 VITALS
BODY MASS INDEX: 21.9 KG/M2 | WEIGHT: 150 LBS | OXYGEN SATURATION: 97 % | HEART RATE: 68 BPM | SYSTOLIC BLOOD PRESSURE: 124 MMHG | DIASTOLIC BLOOD PRESSURE: 70 MMHG

## 2023-05-01 DIAGNOSIS — K60.0 ACUTE POSTERIOR ANAL FISSURE: Primary | ICD-10-CM

## 2023-05-01 DIAGNOSIS — K59.00 CONSTIPATION, UNSPECIFIED CONSTIPATION TYPE: ICD-10-CM

## 2023-05-01 PROCEDURE — 1123F ACP DISCUSS/DSCN MKR DOCD: CPT | Performed by: STUDENT IN AN ORGANIZED HEALTH CARE EDUCATION/TRAINING PROGRAM

## 2023-05-01 PROCEDURE — 99213 OFFICE O/P EST LOW 20 MIN: CPT | Performed by: STUDENT IN AN ORGANIZED HEALTH CARE EDUCATION/TRAINING PROGRAM

## 2023-05-01 ASSESSMENT — ENCOUNTER SYMPTOMS
SINUS PAIN: 0
ABDOMINAL PAIN: 0
COUGH: 0
DIARRHEA: 0
SORE THROAT: 0
VOMITING: 0
NAUSEA: 0
BACK PAIN: 0
WHEEZING: 0

## 2023-05-01 NOTE — PATIENT INSTRUCTIONS
SURVEY:    You may be receiving a survey from [employer] regarding your visit today. You may get this in the mail, through your MyChart or in your email. Please complete the survey to enable us to provide the highest quality of care to you and your family. If you cannot score us as very good ( 5 Stars) on any question, please feel free to call the office to discuss how we could have made your experience exceptional.     Thank you.     Clinical Care Team:  Dr. Maria M Grant, DO Herbie Savage LPN    Triage:  Yanet Smith, [address] [facility] Team:  Carlin Baxter

## 2023-05-01 NOTE — PROGRESS NOTES
HPI Notes    Name: Estevan Hinson  : 1940         Chief Complaint:     Chief Complaint   Patient presents with    Constipation     Follow up. Patient was constipated the last 3 days but was able to have a BM this morning. History of Present Illness:        HPI    This is a very nice 82-year-old man presenting for reevaluation of an acute to subacute posterior anal fissure. At previous outpatient visit, I had recommended he begin using a compounded mixture of 0.5% nifedipine and 5% lidocaine ointment. He was able to pick this up from the [facility], as prescription nitroglycerin 0.4% ointment had been cost-prohibitively expensive. Symptoms have improved greatly, and he has noted no side effects, adverse effects from this ointment. He reports he is no longer constipated and is having well formed type IV regular bowel movements. Past Medical History:     Past Medical History:   Diagnosis Date    GERD (gastroesophageal reflux disease)     Hyperlipidemia       Reviewed all health maintenance requirements and ordered appropriate tests  There are no preventive care reminders to display for this patient. Past Surgical History:     Past Surgical History:   Procedure Laterality Date    COLONOSCOPY      polyp    COLONOSCOPY  2016    sigmoid diverticula    CYSTOSCOPY      [facility]-     INTRACAPSULAR CATARACT EXTRACTION      Bilat    PROSTATE SURGERY      [place]- Dr. Pipo Del Cid (15 years ago)    TONSILLECTOMY          Medications:       Prior to Admission medications    Medication Sig Start Date End Date Taking? Authorizing Provider   Misc. Devices MISC Nifedipine 0.5%/lidocaine 5% ointment, apply a pea-sized amount to the anus 2-3 times daily every day for 8 weeks. Dispense 30 g, 2 refills 23  Yes Keyla Horne, DO   nitroglycerin (RECTIV) 0.4 % rectal ointment Place 1 inch rectally in the morning and 1 inch in the evening.  23  Yes Denise Borges, DO   ezetimibe ([facility]) 10

## 2023-07-27 ENCOUNTER — OFFICE VISIT (OUTPATIENT)
Dept: FAMILY MEDICINE CLINIC | Age: 83
End: 2023-07-27

## 2023-07-27 VITALS
DIASTOLIC BLOOD PRESSURE: 70 MMHG | WEIGHT: 153 LBS | OXYGEN SATURATION: 97 % | BODY MASS INDEX: 22.33 KG/M2 | HEART RATE: 63 BPM | SYSTOLIC BLOOD PRESSURE: 134 MMHG

## 2023-07-27 DIAGNOSIS — S16.1XXA CERVICAL MUSCLE STRAIN, INITIAL ENCOUNTER: ICD-10-CM

## 2023-07-27 DIAGNOSIS — M99.07 SOMATIC DYSFUNCTION OF BOTH UPPER EXTREMITIES: ICD-10-CM

## 2023-07-27 DIAGNOSIS — M54.2 CERVICALGIA: ICD-10-CM

## 2023-07-27 DIAGNOSIS — M54.2 ACUTE NECK PAIN: Primary | ICD-10-CM

## 2023-07-27 DIAGNOSIS — M99.01 SOMATIC DYSFUNCTION OF SPINE, CERVICAL: ICD-10-CM

## 2023-07-27 ASSESSMENT — ENCOUNTER SYMPTOMS
WHEEZING: 0
SORE THROAT: 0
BACK PAIN: 0
VOMITING: 0
SINUS PAIN: 0
COUGH: 0
NAUSEA: 0
DIARRHEA: 0
ABDOMINAL PAIN: 0

## 2023-07-27 NOTE — PROGRESS NOTES
LPN    Triage:  Enriqueta Dumas, [address] [facility] Team:  Pb Swenson      Electronically signed by Gonsalo Sawant DO on 7/27/2023 at 1:30 PM           Completed Refills   Requested Prescriptions      No prescriptions requested or ordered in this encounter

## 2023-07-27 NOTE — PATIENT INSTRUCTIONS
Albina Ortez,    I believe you've strained your neck and shoulders    You may take up to six 650 mg acetaminophen (Tylenol) tablets per day for pain. I suggest 1300 mg three times a day  You may take up to twelve 200 mg ibuprofen (Motrin/Advil) tablets per day for pain. I suggest 800 mg three times a day    Offset them by three hours each (Tylenol at 8:00, motrin at 11:00, tylenol at 2:00. ..etc)    Come back in 1 day so that I can treat you with OMT (osteopathic manipulative therapy). Dr Yessenia Lucas:    Celio Wilkins may be receiving a survey from [email or web address] regarding your visit today. You may get this in the mail, through your MyChart or in your email. Please complete the survey to enable us to provide the highest quality of care to you and your family. If you cannot score us as very good ( 5 Stars) on any question, please feel free to call the office to discuss how we could have made your experience exceptional.     Thank you.     Clinical Care Team:  DO Leonardo Villafana, CANDICE    Triage:  Nahum Petty, [address] [facility] Team:  Fady Yeboah

## 2023-07-28 ENCOUNTER — OFFICE VISIT (OUTPATIENT)
Dept: FAMILY MEDICINE CLINIC | Age: 83
End: 2023-07-28

## 2023-07-28 ENCOUNTER — TELEPHONE (OUTPATIENT)
Dept: FAMILY MEDICINE CLINIC | Age: 83
End: 2023-07-28

## 2023-07-28 VITALS
OXYGEN SATURATION: 98 % | WEIGHT: 153 LBS | DIASTOLIC BLOOD PRESSURE: 70 MMHG | BODY MASS INDEX: 22.33 KG/M2 | SYSTOLIC BLOOD PRESSURE: 138 MMHG | HEART RATE: 54 BPM

## 2023-07-28 DIAGNOSIS — S16.1XXA CERVICAL MUSCLE STRAIN, INITIAL ENCOUNTER: ICD-10-CM

## 2023-07-28 DIAGNOSIS — M99.07 SOMATIC DYSFUNCTION OF BOTH UPPER EXTREMITIES: ICD-10-CM

## 2023-07-28 DIAGNOSIS — M54.2 ACUTE NECK PAIN: Primary | ICD-10-CM

## 2023-07-28 DIAGNOSIS — M99.01 SOMATIC DYSFUNCTION OF SPINE, CERVICAL: ICD-10-CM

## 2023-07-28 NOTE — PATIENT INSTRUCTIONS
SURVEY:    You may be receiving a survey from [employer] regarding your visit today. You may get this in the mail, through your MyChart or in your email. Please complete the survey to enable us to provide the highest quality of care to you and your family. If you cannot score us as very good ( 5 Stars) on any question, please feel free to call the office to discuss how we could have made your experience exceptional.     Thank you.     Clinical Care Team:  Dr. Liliana Marmolejo, DO Georgette Jacobo LPN    Triage:  Bettie Garcia, [address] [facility] Team:  Maribel Singer

## 2023-07-28 NOTE — TELEPHONE ENCOUNTER
Renetta Matiasenrique stopped back in the office wanting to know if Dr. Gunjan Nicolas wanted him to continue the same medication. He wasn't sure. Has a f/u on 8/2.     Health Maintenance   Topic Date Due    Flu vaccine (1) 08/01/2023    Depression Screen  01/26/2024    Annual Wellness Visit (AWV)  01/27/2024    DTaP/Tdap/Td vaccine (3 - Td or Tdap) 12/11/2029    Shingles vaccine  Completed    Pneumococcal 65+ years Vaccine  Completed    COVID-19 Vaccine  Completed    Hepatitis A vaccine  Aged Out    Hib vaccine  Aged Out    Meningococcal (ACWY) vaccine  Aged Out    Hepatitis B vaccine  Discontinued             (applicable per patient's age: Cancer Screenings, Depression Screening, Fall Risk Screening, Immunizations)    Hemoglobin A1C (%)   Date Value   01/26/2023 5.7   01/19/2021 5.6   11/21/2019 6.0     LDL Cholesterol (mg/dL)   Date Value   01/26/2023 117     LDL Calculated (mg/dL)   Date Value   11/21/2019 158     AST (U/L)   Date Value   01/24/2022 20     ALT (U/L)   Date Value   01/24/2022 15     BUN (mg/dL)   Date Value   01/26/2023 14      (goal A1C is < 7)   (goal LDL is <100) need 30-50% reduction from baseline     BP Readings from Last 3 Encounters:   07/28/23 138/70   07/27/23 134/70   05/01/23 124/70    (goal /80)      All Future Testing planned in Care[facility]:  Lab Frequency Next Occurrence   US SCROTUM W LIMITED DUPLEX Once 11/17/2023   POCT occult blood stool Once 03/06/2023       Next Visit Date:  Future Appointments   Date Time Provider [address]   8/2/2023  9:00 AM Kole Barros DO [facility]   11/17/2023 11:30 AM [address] [place]   11/30/2023  1:00 PM [facility] JUAN PABLO Prieto urol [facility]            Patient Active Problem List:     Pure hypercholesterolemia     Esophageal reflux     Other abnormal glucose     Hypertrophy of prostate without urinary obstruction and other lower urinary tract symptoms (LUTS)     History of colon polyps     Epididymal cyst     Scrotal

## 2023-08-02 ENCOUNTER — OFFICE VISIT (OUTPATIENT)
Dept: FAMILY MEDICINE CLINIC | Age: 83
End: 2023-08-02

## 2023-08-02 VITALS
WEIGHT: 153 LBS | OXYGEN SATURATION: 98 % | SYSTOLIC BLOOD PRESSURE: 130 MMHG | BODY MASS INDEX: 22.33 KG/M2 | HEART RATE: 53 BPM | DIASTOLIC BLOOD PRESSURE: 68 MMHG

## 2023-08-02 DIAGNOSIS — S16.1XXA CERVICAL MUSCLE STRAIN, INITIAL ENCOUNTER: ICD-10-CM

## 2023-08-02 DIAGNOSIS — M54.2 ACUTE NECK PAIN: Primary | ICD-10-CM

## 2023-08-02 DIAGNOSIS — M99.07 SOMATIC DYSFUNCTION OF BOTH UPPER EXTREMITIES: ICD-10-CM

## 2023-08-02 DIAGNOSIS — M99.01 SOMATIC DYSFUNCTION OF SPINE, CERVICAL: ICD-10-CM

## 2023-08-02 PROCEDURE — 99999 PR OFFICE/OUTPT VISIT,PROCEDURE ONLY: CPT | Performed by: STUDENT IN AN ORGANIZED HEALTH CARE EDUCATION/TRAINING PROGRAM

## 2023-08-02 NOTE — PATIENT INSTRUCTIONS
Marija Liban you for coming to see me today! I believe that our main problem is your neck pain. Here's what I would recommend we do: It's time to slowly decrease your pain regimen. You will need to change your dosage for both Tylenol and Advil. Cut your current dose of acetaminophen in half. Take a 325 mg tablet instead. Cut your current dose of ibuprofen in half. Take only one tablet at a time. After two weeks, you may stop all pain medications entirely    We also discussed coming back after that time for a reevaluation. Please review the documents I'm attaching related to what we discussed today. Please give me a call or message me on [employer] if you have any problems or questions, and I will see you at your next visit. Dr. Margarita Augustin:    Roverto Tapia may be receiving a survey from [employer] regarding your visit today. You may get this in the mail, through your [employer] or in your email. Please complete the survey to enable us to provide the highest quality of care to you and your family. If you cannot score us as very good ( 5 Stars) on any question, please feel free to call the office to discuss how we could have made your experience exceptional.     Thank you.     Clinical Care Team:  Dr. Gonsalo Sawant, DO Rj Riley LPN    Triage:  Enriqueta Dumas, [address] [facility] Team:  Lefty Bermudez

## 2023-08-16 ENCOUNTER — OFFICE VISIT (OUTPATIENT)
Dept: FAMILY MEDICINE CLINIC | Age: 83
End: 2023-08-16
Payer: MEDICARE

## 2023-08-16 VITALS
OXYGEN SATURATION: 98 % | SYSTOLIC BLOOD PRESSURE: 132 MMHG | HEART RATE: 55 BPM | WEIGHT: 153 LBS | DIASTOLIC BLOOD PRESSURE: 80 MMHG | BODY MASS INDEX: 22.33 KG/M2

## 2023-08-16 DIAGNOSIS — E78.00 PURE HYPERCHOLESTEROLEMIA: ICD-10-CM

## 2023-08-16 DIAGNOSIS — M99.01 SOMATIC DYSFUNCTION OF SPINE, CERVICAL: ICD-10-CM

## 2023-08-16 DIAGNOSIS — S16.1XXD CERVICAL MUSCLE STRAIN, SUBSEQUENT ENCOUNTER: Primary | ICD-10-CM

## 2023-08-16 DIAGNOSIS — M99.07 SOMATIC DYSFUNCTION OF BOTH UPPER EXTREMITIES: ICD-10-CM

## 2023-08-16 PROCEDURE — 99999 PR OFFICE/OUTPT VISIT,PROCEDURE ONLY: CPT | Performed by: STUDENT IN AN ORGANIZED HEALTH CARE EDUCATION/TRAINING PROGRAM

## 2023-08-16 PROCEDURE — 98925 OSTEOPATH MANJ 1-2 REGIONS: CPT | Performed by: STUDENT IN AN ORGANIZED HEALTH CARE EDUCATION/TRAINING PROGRAM

## 2023-08-16 RX ORDER — EZETIMIBE 10 MG/1
10 TABLET ORAL DAILY
Qty: 90 TABLET | Refills: 3 | Status: SHIPPED | OUTPATIENT
Start: 2023-08-16

## 2023-08-16 NOTE — PATIENT INSTRUCTIONS
SURVEY:    You may be receiving a survey from [employer] regarding your visit today. You may get this in the mail, through your MyChart or in your email. Please complete the survey to enable us to provide the highest quality of care to you and your family. If you cannot score us as very good ( 5 Stars) on any question, please feel free to call the office to discuss how we could have made your experience exceptional.     Thank you.     Clinical Care Team:  Dr. Leeanna Church, DO Migel Patel LPN    Triage:  Toni Huggins, [address] [facility] Team:  Sol Hunter

## 2023-11-16 DIAGNOSIS — N40.1 BPH WITH OBSTRUCTION/LOWER URINARY TRACT SYMPTOMS: ICD-10-CM

## 2023-11-16 DIAGNOSIS — R35.1 NOCTURIA: ICD-10-CM

## 2023-11-16 DIAGNOSIS — N13.8 BPH WITH OBSTRUCTION/LOWER URINARY TRACT SYMPTOMS: ICD-10-CM

## 2023-11-16 RX ORDER — TAMSULOSIN HYDROCHLORIDE 0.4 MG/1
0.4 CAPSULE ORAL EVERY EVENING
Qty: 90 CAPSULE | Refills: 3 | Status: SHIPPED | OUTPATIENT
Start: 2023-11-16

## 2023-11-16 NOTE — TELEPHONE ENCOUNTER
Last OV: 8/16/2023  Last RX:    Next scheduled apt: Visit date not found          Pt requesting a refill   Medication pending for your approval

## 2023-11-16 NOTE — TELEPHONE ENCOUNTER
Flomax 0.4 mg    Mail order to [address] [facility] Maintenance   Topic Date Due    Hepatitis B vaccine (2 of 3 - 19+ 3-dose series) 03/30/2010    Flu vaccine (1) 08/01/2023    COVID-19 Vaccine (6 - 2023-24 season) 09/01/2023    Depression Screen  01/26/2024    Annual Wellness Visit (AWV)  01/27/2024    DTaP/Tdap/Td vaccine (3 - Td or Tdap) 12/11/2029    Shingles vaccine  Completed    Pneumococcal 65+ years Vaccine  Completed    Hepatitis A vaccine  Aged Out    Hib vaccine  Aged Out    Meningococcal (ACWY) vaccine  Aged Out             (applicable per patient's age: Cancer Screenings, Depression Screening, Fall Risk Screening, Immunizations)    Hemoglobin A1C (%)   Date Value   01/26/2023 5.7   01/19/2021 5.6   11/21/2019 6.0     LDL Cholesterol (mg/dL)   Date Value   01/26/2023 117     LDL Calculated (mg/dL)   Date Value   11/21/2019 158     AST (U/L)   Date Value   01/24/2022 20     ALT (U/L)   Date Value   01/24/2022 15     BUN (mg/dL)   Date Value   01/26/2023 14      (goal A1C is < 7)   (goal LDL is <100) need 30-50% reduction from baseline     BP Readings from Last 3 Encounters:   08/16/23 132/80   08/02/23 130/68   07/28/23 138/70    (goal /80)      All Future Testing planned in Care[facility]:  Lab Frequency Next Occurrence   US SCROTUM W LIMITED DUPLEX Once 11/17/2023   POCT occult blood stool Once 03/06/2023       Next Visit Date:  Future Appointments   Date Time Provider [address]   11/17/2023 11:30 AM [address] [place]   11/30/2023  1:00 PM JUAN PABLO Jauregui urol [facility]            Patient Active Problem List:     Pure hypercholesterolemia     Esophageal reflux     Other abnormal glucose     Hypertrophy of prostate without urinary obstruction and other lower urinary tract symptoms (LUTS)     History of colon polyps     Epididymal cyst     Scrotal pain     Seasonal allergies     Other hemorrhoids

## 2023-11-17 ENCOUNTER — HOSPITAL ENCOUNTER (OUTPATIENT)
Dept: ULTRASOUND IMAGING | Age: 83
End: 2023-11-17
Payer: MEDICARE

## 2023-11-17 DIAGNOSIS — N50.3 EPIDIDYMAL CYST: ICD-10-CM

## 2023-11-17 PROCEDURE — 93976 VASCULAR STUDY: CPT

## 2023-11-30 ENCOUNTER — OFFICE VISIT (OUTPATIENT)
Dept: UROLOGY | Age: 83
End: 2023-11-30
Payer: MEDICARE

## 2023-11-30 VITALS
WEIGHT: 152 LBS | SYSTOLIC BLOOD PRESSURE: 136 MMHG | DIASTOLIC BLOOD PRESSURE: 64 MMHG | BODY MASS INDEX: 23.04 KG/M2 | HEIGHT: 68 IN

## 2023-11-30 DIAGNOSIS — N40.1 BPH WITH OBSTRUCTION/LOWER URINARY TRACT SYMPTOMS: Primary | ICD-10-CM

## 2023-11-30 DIAGNOSIS — N13.8 BPH WITH OBSTRUCTION/LOWER URINARY TRACT SYMPTOMS: Primary | ICD-10-CM

## 2023-11-30 DIAGNOSIS — N50.3 EPIDIDYMAL CYST: ICD-10-CM

## 2023-11-30 PROCEDURE — 99213 OFFICE O/P EST LOW 20 MIN: CPT | Performed by: PHYSICIAN ASSISTANT

## 2023-11-30 PROCEDURE — 1123F ACP DISCUSS/DSCN MKR DOCD: CPT | Performed by: PHYSICIAN ASSISTANT

## 2023-11-30 PROCEDURE — 51798 US URINE CAPACITY MEASURE: CPT | Performed by: PHYSICIAN ASSISTANT

## 2023-11-30 ASSESSMENT — ENCOUNTER SYMPTOMS
WHEEZING: 0
CONSTIPATION: 0
BACK PAIN: 0
COLOR CHANGE: 0
EYE REDNESS: 0
ABDOMINAL PAIN: 0
COUGH: 0
VOMITING: 0
SHORTNESS OF BREATH: 0
NAUSEA: 0

## 2023-11-30 NOTE — PATIENT INSTRUCTIONS
SURVEY:    You may be receiving a survey from [employer] regarding your visit today. Please complete the survey to enable us to provide the highest quality of care to you and your family. If you cannot score us a very good on any question, please call the office to discuss how we could have made your experience a very good one. Thank you.       CHETNA Mcdonnell [place]

## 2024-02-01 ENCOUNTER — HOSPITAL ENCOUNTER (OUTPATIENT)
Age: 84
Discharge: HOME | End: 2024-02-01
Payer: MEDICARE

## 2024-02-01 ENCOUNTER — OFFICE VISIT (OUTPATIENT)
Dept: FAMILY MEDICINE CLINIC | Age: 84
End: 2024-02-01

## 2024-02-01 VITALS
DIASTOLIC BLOOD PRESSURE: 70 MMHG | SYSTOLIC BLOOD PRESSURE: 136 MMHG | HEIGHT: 68 IN | BODY MASS INDEX: 23.04 KG/M2 | WEIGHT: 152 LBS | OXYGEN SATURATION: 98 % | HEART RATE: 58 BPM

## 2024-02-01 DIAGNOSIS — Z13.1 SCREENING FOR DIABETES MELLITUS: ICD-10-CM

## 2024-02-01 DIAGNOSIS — Z13.220 SCREENING FOR HYPERLIPIDEMIA: ICD-10-CM

## 2024-02-01 DIAGNOSIS — Z13.6 SCREENING FOR HYPERTENSION: ICD-10-CM

## 2024-02-01 DIAGNOSIS — Z12.5 SCREENING FOR PROSTATE CANCER: ICD-10-CM

## 2024-02-01 DIAGNOSIS — Z00.00 MEDICARE ANNUAL WELLNESS VISIT, SUBSEQUENT: ICD-10-CM

## 2024-02-01 DIAGNOSIS — Z13.31 SCREENING FOR DEPRESSION: ICD-10-CM

## 2024-02-01 DIAGNOSIS — Z00.00 MEDICARE ANNUAL WELLNESS VISIT, SUBSEQUENT: Primary | ICD-10-CM

## 2024-02-01 LAB
ALBUMIN SERPL-MCNC: 4.2 G/DL (ref 3.5–5.2)
ALP SERPL-CCNC: 61 U/L (ref 40–129)
ALT SERPL-CCNC: 18 U/L (ref 5–41)
ANION GAP SERPL CALCULATED.3IONS-SCNC: 12 MMOL/L (ref 9–17)
AST SERPL-CCNC: 24 U/L
BASOPHILS # BLD: 0.03 K/UL (ref 0–0.2)
BASOPHILS NFR BLD: 1 % (ref 0–2)
BILIRUB SERPL-MCNC: 0.5 MG/DL (ref 0.3–1.2)
BUN SERPL-MCNC: 15 MG/DL (ref 8–23)
BUN/CREAT SERPL: 17 (ref 9–20)
CALCIUM SERPL-MCNC: 9.1 MG/DL (ref 8.6–10.4)
CHLORIDE SERPL-SCNC: 98 MMOL/L (ref 98–107)
CHOLEST SERPL-MCNC: 198 MG/DL
CHOLESTEROL/HDL RATIO: 5
CO2 SERPL-SCNC: 23 MMOL/L (ref 20–31)
CREAT SERPL-MCNC: 0.9 MG/DL (ref 0.7–1.2)
EOSINOPHIL # BLD: 0.1 K/UL (ref 0–0.4)
EOSINOPHILS RELATIVE PERCENT: 2 % (ref 0–5)
ERYTHROCYTE [DISTWIDTH] IN BLOOD BY AUTOMATED COUNT: 11.7 % (ref 12.1–15.2)
EST. AVERAGE GLUCOSE BLD GHB EST-MCNC: 114 MG/DL
GFR SERPL CREATININE-BSD FRML MDRD: >60 ML/MIN/1.73M2
GLUCOSE SERPL-MCNC: 117 MG/DL (ref 70–99)
HBA1C MFR BLD: 5.6 % (ref 4–6)
HCT VFR BLD AUTO: 42.3 % (ref 41–53)
HDLC SERPL-MCNC: 40 MG/DL
HGB BLD-MCNC: 14.6 G/DL (ref 13.5–17.5)
IMM GRANULOCYTES # BLD AUTO: 0 K/UL (ref 0–0.3)
IMM GRANULOCYTES NFR BLD: 0 % (ref 0–5)
LDLC SERPL CALC-MCNC: 133 MG/DL (ref 0–130)
LYMPHOCYTES NFR BLD: 0.65 K/UL (ref 1–4.8)
LYMPHOCYTES RELATIVE PERCENT: 16 % (ref 13–44)
MCH RBC QN AUTO: 31.3 PG (ref 26–34)
MCHC RBC AUTO-ENTMCNC: 34.5 G/DL (ref 31–37)
MCV RBC AUTO: 90.6 FL (ref 80–100)
MONOCYTES NFR BLD: 0.31 K/UL (ref 0–1)
MONOCYTES NFR BLD: 8 % (ref 5–9)
NEUTROPHILS NFR BLD: 73 % (ref 39–75)
NEUTS SEG NFR BLD: 3.02 K/UL (ref 2.1–6.5)
PLATELET # BLD AUTO: 224 K/UL (ref 140–450)
PMV BLD AUTO: 9.9 FL (ref 6–12)
POTASSIUM SERPL-SCNC: 3.9 MMOL/L (ref 3.7–5.3)
PROT SERPL-MCNC: 6.8 G/DL (ref 6.4–8.3)
RBC # BLD AUTO: 4.67 M/UL (ref 4.5–5.9)
SODIUM SERPL-SCNC: 133 MMOL/L (ref 135–144)
TRIGL SERPL-MCNC: 125 MG/DL
WBC OTHER # BLD: 4.1 K/UL (ref 3.5–11)

## 2024-02-01 PROCEDURE — 80061 LIPID PANEL: CPT

## 2024-02-01 PROCEDURE — 36415 COLL VENOUS BLD VENIPUNCTURE: CPT

## 2024-02-01 PROCEDURE — 83036 HEMOGLOBIN GLYCOSYLATED A1C: CPT

## 2024-02-01 PROCEDURE — 85025 COMPLETE CBC W/AUTO DIFF WBC: CPT

## 2024-02-01 PROCEDURE — 80053 COMPREHEN METABOLIC PANEL: CPT

## 2024-02-01 SDOH — HEALTH STABILITY: PHYSICAL HEALTH: ON AVERAGE, HOW MANY MINUTES DO YOU ENGAGE IN EXERCISE AT THIS LEVEL?: 40 MIN

## 2024-02-01 SDOH — HEALTH STABILITY: PHYSICAL HEALTH: ON AVERAGE, HOW MANY DAYS PER WEEK DO YOU ENGAGE IN MODERATE TO STRENUOUS EXERCISE (LIKE A BRISK WALK)?: 7 DAYS

## 2024-02-01 ASSESSMENT — LIFESTYLE VARIABLES
HOW MANY STANDARD DRINKS CONTAINING ALCOHOL DO YOU HAVE ON A TYPICAL DAY: 1
HOW MANY STANDARD DRINKS CONTAINING ALCOHOL DO YOU HAVE ON A TYPICAL DAY: 1 OR 2
HOW OFTEN DO YOU HAVE SIX OR MORE DRINKS ON ONE OCCASION: 1
HOW OFTEN DO YOU HAVE A DRINK CONTAINING ALCOHOL: MONTHLY OR LESS
HOW OFTEN DO YOU HAVE A DRINK CONTAINING ALCOHOL: 2

## 2024-02-01 ASSESSMENT — PATIENT HEALTH QUESTIONNAIRE - PHQ9
SUM OF ALL RESPONSES TO PHQ QUESTIONS 1-9: 0
2. FEELING DOWN, DEPRESSED OR HOPELESS: 0
SUM OF ALL RESPONSES TO PHQ9 QUESTIONS 1 & 2: 0
1. LITTLE INTEREST OR PLEASURE IN DOING THINGS: 0
SUM OF ALL RESPONSES TO PHQ QUESTIONS 1-9: 0

## 2024-02-01 NOTE — PROGRESS NOTES
Medicare Annual Wellness Visit    Juan Sarabia is here for Medicare AWV    Assessment & Plan   Medicare annual wellness visit, subsequent  -     Comprehensive Metabolic Panel; Future  -     CBC with Auto Differential; Future  Screening for hypertension  Screening for depression  Screening for hyperlipidemia  -     Lipid Panel; Future  Screening for diabetes mellitus  -     Hemoglobin A1C; Future  Screening for prostate cancer  -     PSA Screening    Recommendations for Preventive Services Due: see orders and patient instructions/AVS.  Recommended screening schedule for the next 5-10 years is provided to the patient in written form: see Patient Instructions/AVS.     Return in 1 year (on 2/1/2025) for Well Visit.     Subjective     Patient's complete Health Risk Assessment and screening values have been reviewed and are found in Flowsheets. The following problems were reviewed today and where indicated follow up appointments were made and/or referrals ordered.    Had eye exam with optometrist yesterday, updated lens prescription. Has dental appt this month with [facility], has those twice yearly.     No Positive Risk Factors identified today.                              Objective   Vitals:    02/01/24 0827   BP: 136/70   Pulse: 58   SpO2: 98%   Weight: 68.9 kg (152 lb)   Height: 1.727 m (5' 8\")      Body mass index is 23.11 kg/m².      General Appearance: alert and oriented to person, place and time, well developed and well- nourished, in no acute distress  Skin: warm and dry, no rash or erythema  Head: normocephalic and atraumatic  Eyes: pupils equal, round, and reactive to light, extraocular eye movements intact, conjunctivae normal  ENT: tympanic membrane, external ear and ear canal normal bilaterally, nose without deformity, nasal mucosa and turbinates normal without polyps  Neck: supple and non-tender without mass, no thyromegaly or thyroid nodules, no cervical lymphadenopathy  Pulmonary/Chest: clear

## 2024-02-01 NOTE — PATIENT INSTRUCTIONS
SURVEY:    You may be receiving a survey from [employer] regarding your visit today.    You may get this in the mail, through your MyChart or in your email.     Please complete the survey to enable us to provide the highest quality of care to you and your family.    If you cannot score us as very good ( 5 Stars) on any question, please feel free to call the office to discuss how we could have made your experience exceptional.     Thank you.    Clinical Care Team:  DO Tete Syed LPN    Triage:  Myriam Degroot CMA    Clerical Team:  Myriam RAMIREZ Healthy Heart: Care Instructions  Your Care Instructions     Coronary artery disease, also called heart disease, occurs when a substance called plaque builds up in the vessels that supply oxygen-rich blood to your heart muscle. This can narrow the blood vessels and reduce blood flow. A heart attack happens when blood flow is completely blocked. A high-fat diet, smoking, and other factors increase the risk of heart disease.  Your doctor has found that you have a chance of having heart disease. You can do lots of things to keep your heart healthy. It may not be easy, but you can change your diet, exercise more, and quit smoking. These steps really work to lower your chance of heart disease.  Follow-up care is a key part of your treatment and safety. Be sure to make and go to all appointments, and call your doctor if you are having problems. It's also a good idea to know your test results and keep a list of the medicines you take.  How can you care for yourself at home?  Diet    Use less salt when you cook and eat. This helps lower your blood pressure. Taste food before salting. Add only a little salt when you think you need it. With time, your taste buds will adjust to less salt.     Eat fewer snack items, fast foods, canned

## 2024-02-05 DIAGNOSIS — Z12.5 SCREENING FOR PROSTATE CANCER: Primary | ICD-10-CM

## 2024-02-07 ENCOUNTER — HOSPITAL ENCOUNTER (OUTPATIENT)
Age: 84
Discharge: HOME | End: 2024-02-07
Payer: MEDICARE

## 2024-02-07 LAB — PSA SERPL-MCNC: 2.5 NG/ML (ref 0–4)

## 2024-02-07 PROCEDURE — 36415 COLL VENOUS BLD VENIPUNCTURE: CPT

## 2024-02-07 PROCEDURE — G0103 PSA SCREENING: HCPCS

## 2024-06-07 RX ORDER — LEVOCETIRIZINE DIHYDROCHLORIDE 5 MG/1
5 TABLET, FILM COATED ORAL NIGHTLY
Qty: 90 TABLET | Refills: 3 | Status: SHIPPED | OUTPATIENT
Start: 2024-06-07

## 2024-06-07 NOTE — TELEPHONE ENCOUNTER
Xyzal 5 mg    [facility]        AWV 2/1/24    Health Maintenance   Topic Date Due    Polio vaccine (2 of 3 - Adult catch-up series) 10/07/2009    Hepatitis B vaccine (2 of 3 - 19+ 3-dose series) 03/30/2010    Depression Screen  02/01/2025    DTaP/Tdap/Td vaccine (3 - Td or Tdap) 12/11/2029    Annual Wellness Visit (Medicare Advantage)  Completed    Flu vaccine  Completed    Shingles vaccine  Completed    Pneumococcal 65+ years Vaccine  Completed    COVID-19 Vaccine  Completed    Respiratory Syncytial Virus (RSV) Pregnant or age 60 yrs+  Completed    Hepatitis A vaccine  Aged Out    Hib vaccine  Aged Out    Meningococcal (ACWY) vaccine  Aged Out             (applicable per patient's age: Cancer Screenings, Depression Screening, Fall Risk Screening, Immunizations)    Hemoglobin A1C (%)   Date Value   02/01/2024 5.6   01/26/2023 5.7   01/19/2021 5.6     AST (U/L)   Date Value   02/01/2024 24     ALT (U/L)   Date Value   02/01/2024 18     BUN (mg/dL)   Date Value   02/01/2024 15      (goal A1C is < 7)   (goal LDL is <100) need 30-50% reduction from baseline     BP Readings from Last 3 Encounters:   02/01/24 136/70   11/30/23 136/64   08/16/23 132/80    (goal /80)      All Future Testing planned in CarePATH:  Lab Frequency Next Occurrence       Next Visit Date:  Future Appointments   Date Time Provider Department Center   12/5/2024  1:00 PM Valentino Medina, JUAN PABLO henry urol [facility]            Patient Active Problem List:     Pure hypercholesterolemia     Esophageal reflux     Other abnormal glucose     Hypertrophy of prostate without urinary obstruction and other lower urinary tract symptoms (LUTS)     History of colon polyps     Epididymal cyst     Scrotal pain     Seasonal allergies     Other hemorrhoids

## 2024-08-22 DIAGNOSIS — E78.00 PURE HYPERCHOLESTEROLEMIA: ICD-10-CM

## 2024-08-22 RX ORDER — EZETIMIBE 10 MG/1
10 TABLET ORAL DAILY
Qty: 90 TABLET | Refills: 3 | Status: SHIPPED | OUTPATIENT
Start: 2024-08-22

## 2024-08-22 NOTE — TELEPHONE ENCOUNTER
Last OV: 2/1/2024   AWV   Last RX:    Next scheduled apt: Visit date not found          Surescript requesting a refill

## 2024-08-22 NOTE — TELEPHONE ENCOUNTER
Zetia 10 mg    [facility]    AWV 2/1/24    Health Maintenance   Topic Date Due    Polio vaccine (2 of 3 - Adult catch-up series) 10/07/2009    Hepatitis B vaccine (2 of 3 - 19+ 3-dose series) 03/30/2010    COVID-19 Vaccine (7 - 2023-24 season) 02/17/2024    Flu vaccine (1) 08/01/2024    Depression Screen  02/01/2025    DTaP/Tdap/Td vaccine (3 - Td or Tdap) 12/11/2029    Annual Wellness Visit (Medicare Advantage)  Completed    Shingles vaccine  Completed    Pneumococcal 65+ years Vaccine  Completed    Respiratory Syncytial Virus (RSV) Pregnant or age 60 yrs+  Completed    Hepatitis A vaccine  Aged Out    Hib vaccine  Aged Out    Meningococcal (ACWY) vaccine  Aged Out             (applicable per patient's age: Cancer Screenings, Depression Screening, Fall Risk Screening, Immunizations)    Hemoglobin A1C (%)   Date Value   02/01/2024 5.6   01/26/2023 5.7   01/19/2021 5.6     AST (U/L)   Date Value   02/01/2024 24     ALT (U/L)   Date Value   02/01/2024 18     BUN (mg/dL)   Date Value   02/01/2024 15      (goal A1C is < 7)   (goal LDL is <100) need 30-50% reduction from baseline     BP Readings from Last 3 Encounters:   02/01/24 136/70   11/30/23 136/64   08/16/23 132/80    (goal /80)      All Future Testing planned in CarePATH:  Lab Frequency Next Occurrence       Next Visit Date:  Future Appointments   Date Time Provider Department Center   12/5/2024  1:00 PM Valentino Medina, [facility] henry [facility]            Patient Active Problem List:     Pure hypercholesterolemia     Esophageal reflux     Other abnormal glucose     Hypertrophy of prostate without urinary obstruction and other lower urinary tract symptoms (LUTS)     History of colon polyps     Epididymal cyst     Scrotal pain     Seasonal allergies     Other hemorrhoids

## 2024-10-10 ENCOUNTER — OFFICE VISIT (OUTPATIENT)
Dept: FAMILY MEDICINE CLINIC | Age: 84
End: 2024-10-10

## 2024-10-10 VITALS
BODY MASS INDEX: 22.66 KG/M2 | DIASTOLIC BLOOD PRESSURE: 70 MMHG | OXYGEN SATURATION: 99 % | SYSTOLIC BLOOD PRESSURE: 136 MMHG | HEART RATE: 63 BPM | WEIGHT: 149 LBS

## 2024-10-10 DIAGNOSIS — M99.07 SOMATIC DYSFUNCTION OF RIGHT UPPER EXTREMITY: ICD-10-CM

## 2024-10-10 DIAGNOSIS — M99.00 SOMATIC DYSFUNCTION OF HEAD REGION: ICD-10-CM

## 2024-10-10 DIAGNOSIS — M54.2 CERVICALGIA: ICD-10-CM

## 2024-10-10 DIAGNOSIS — S16.1XXA CERVICAL MUSCLE STRAIN, INITIAL ENCOUNTER: ICD-10-CM

## 2024-10-10 DIAGNOSIS — M54.2 ACUTE NECK PAIN: Primary | ICD-10-CM

## 2024-10-10 DIAGNOSIS — M99.01 SOMATIC DYSFUNCTION OF SPINE, CERVICAL: ICD-10-CM

## 2024-10-10 RX ORDER — CELECOXIB 100 MG/1
100 CAPSULE ORAL 2 TIMES DAILY
Qty: 60 CAPSULE | Refills: 0 | Status: SHIPPED | OUTPATIENT
Start: 2024-10-10

## 2024-10-10 SDOH — ECONOMIC STABILITY: FOOD INSECURITY: WITHIN THE PAST 12 MONTHS, YOU WORRIED THAT YOUR FOOD WOULD RUN OUT BEFORE YOU GOT MONEY TO BUY MORE.: NEVER TRUE

## 2024-10-10 SDOH — ECONOMIC STABILITY: INCOME INSECURITY: HOW HARD IS IT FOR YOU TO PAY FOR THE VERY BASICS LIKE FOOD, HOUSING, MEDICAL CARE, AND HEATING?: NOT HARD AT ALL

## 2024-10-10 SDOH — ECONOMIC STABILITY: FOOD INSECURITY: WITHIN THE PAST 12 MONTHS, THE FOOD YOU BOUGHT JUST DIDN'T LAST AND YOU DIDN'T HAVE MONEY TO GET MORE.: NEVER TRUE

## 2024-10-10 ASSESSMENT — ENCOUNTER SYMPTOMS
COUGH: 0
NAUSEA: 0
VOMITING: 0
DIARRHEA: 0
ABDOMINAL PAIN: 0
SINUS PAIN: 0
WHEEZING: 0
BACK PAIN: 0
SORE THROAT: 0

## 2024-10-10 NOTE — PROGRESS NOTES
HPI Notes    Name: Juan Sarabia  : 1940         Chief Complaint:     Chief Complaint   Patient presents with    Neck Pain     Right sided neck pain started 1 year ago. Recently pain has started to worsen. Taking ibuprofen with little relief. Patient states pain is \"excruciating\".    Hand Pain     Patients is experiencing pain in the knuckles of his hands. The fingers are stiffing and locking up as well.        History of Present Illness:        HPI    This is an 84 year old gentleman presenting for a week of right sided neck pain radiating into his shoulder. He has been taking 400 mg ibuprofen TID with minimal improvement. Overall this problem has been worsening. He has also taken some acetaminophen 650 mg capsules without much improvement. He continues to do chair yoga several times weekly with aROM exercises of the neck, again without much improvement.       Past Medical History:     Past Medical History:   Diagnosis Date    GERD (gastroesophageal reflux disease)     Hyperlipidemia       Reviewed all health maintenance requirements and ordered appropriate tests  Health Maintenance Due   Topic Date Due    Polio vaccine (2 of 3 - Adult catch-up series) 10/07/2009    Hepatitis B vaccine (2 of 3 - 19+ 3-dose series) 2010    Flu vaccine (1) 2024       Past Surgical History:     Past Surgical History:   Procedure Laterality Date    COLONOSCOPY  2006    polyp    COLONOSCOPY  2016    sigmoid diverticula    CYSTOSCOPY      [facility]-     INTRACAPSULAR CATARACT EXTRACTION      Bilat    PROSTATE SURGERY      [place]- Dr. Spaulding (15 years ago)    TONSILLECTOMY          Medications:       Prior to Admission medications    Medication Sig Start Date End Date Taking? Authorizing Provider   tiZANidine (ZANAFLEX) 4 MG tablet Take 1 tablet by mouth nightly as needed (neck pain) 10/10/24  Yes Micheal Horne DO   celecoxib (CELEBREX) 100 MG capsule Take 1 capsule by mouth 2 times daily 10/10/24  Yes

## 2024-10-14 ENCOUNTER — OFFICE VISIT (OUTPATIENT)
Dept: FAMILY MEDICINE CLINIC | Age: 84
End: 2024-10-14
Payer: MEDICARE

## 2024-10-14 VITALS
HEART RATE: 59 BPM | BODY MASS INDEX: 22.66 KG/M2 | SYSTOLIC BLOOD PRESSURE: 136 MMHG | WEIGHT: 149 LBS | DIASTOLIC BLOOD PRESSURE: 70 MMHG | OXYGEN SATURATION: 98 %

## 2024-10-14 DIAGNOSIS — M54.2 CERVICALGIA: ICD-10-CM

## 2024-10-14 DIAGNOSIS — M99.01 SOMATIC DYSFUNCTION OF SPINE, CERVICAL: ICD-10-CM

## 2024-10-14 DIAGNOSIS — S16.1XXD CERVICAL MUSCLE STRAIN, SUBSEQUENT ENCOUNTER: ICD-10-CM

## 2024-10-14 DIAGNOSIS — M99.07 SOMATIC DYSFUNCTION OF RIGHT UPPER EXTREMITY: ICD-10-CM

## 2024-10-14 DIAGNOSIS — M54.2 ACUTE NECK PAIN: Primary | ICD-10-CM

## 2024-10-14 DIAGNOSIS — M99.00 SOMATIC DYSFUNCTION OF HEAD REGION: ICD-10-CM

## 2024-10-14 PROCEDURE — 98926 OSTEOPATH MANJ 3-4 REGIONS: CPT | Performed by: STUDENT IN AN ORGANIZED HEALTH CARE EDUCATION/TRAINING PROGRAM

## 2024-10-14 NOTE — PROGRESS NOTES
Interval history: This is a 84-year-old gentleman presenting for treatment with OMT for previously discussed somatic dysfunction due to a cervical muscle strain.  His discomfort has not appreciably changed since prior outpatient visit    /70   Pulse 59   Wt 67.6 kg (149 lb)   SpO2 98%   BMI 22.66 kg/m²     Procedure Note  After history taking and examination of patient, it was determined that a beneficial treatment modality for their complaint would be osteopathic manipulative therapy (OMT) the nature of OMT, treatment goals, mechanism of action, and side effects were extensively discussed with this patient, who did wish to proceed with the procedure.  The following body systems were treated with osteopathy during our office visit today: Head, cervical, right upper extremity.  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  Treatment was performed using OA release, FPR, strain, counterstrain techniques.  After treatment, the patient was reevaluated and the following physical exam findings were appreciated: Resolution of somatic dysfunction.  We determined that the next best time for the patient to return to the office for additional treatment would be in 1 week, if needed.    At this point, her treatment was concluded, there were no complications, there were no further questions.  Patient tolerated this procedure very well.    Micheal Horne DO  10/14/2024  8:40 AM

## 2024-10-14 NOTE — PATIENT INSTRUCTIONS
SURVEY:    You may be receiving a survey from [facility] regarding your visit today.    You may get this in the mail, through your MyChart or in your email.     Please complete the survey to enable us to provide the highest quality of care to you and your family.    If you cannot score us as very good ( 5 Stars) on any question, please feel free to call the office to discuss how we could have made your experience exceptional.     Thank you.    Clinical Care Team:  Dr. Micheal Horne, DO Tete Salazar LPN    Triage:  Myriam Degroot CMA    Clerical Team:  Myriam Davila

## 2024-10-28 ENCOUNTER — OFFICE VISIT (OUTPATIENT)
Dept: FAMILY MEDICINE CLINIC | Age: 84
End: 2024-10-28
Payer: MEDICARE

## 2024-10-28 VITALS
OXYGEN SATURATION: 100 % | SYSTOLIC BLOOD PRESSURE: 136 MMHG | HEART RATE: 69 BPM | WEIGHT: 149 LBS | BODY MASS INDEX: 22.66 KG/M2 | DIASTOLIC BLOOD PRESSURE: 70 MMHG

## 2024-10-28 DIAGNOSIS — M99.00 SOMATIC DYSFUNCTION OF HEAD REGION: ICD-10-CM

## 2024-10-28 DIAGNOSIS — R35.1 NOCTURIA: ICD-10-CM

## 2024-10-28 DIAGNOSIS — N40.1 BPH WITH OBSTRUCTION/LOWER URINARY TRACT SYMPTOMS: ICD-10-CM

## 2024-10-28 DIAGNOSIS — S16.1XXD CERVICAL MUSCLE STRAIN, SUBSEQUENT ENCOUNTER: ICD-10-CM

## 2024-10-28 DIAGNOSIS — M99.07 SOMATIC DYSFUNCTION OF RIGHT UPPER EXTREMITY: ICD-10-CM

## 2024-10-28 DIAGNOSIS — N13.8 BPH WITH OBSTRUCTION/LOWER URINARY TRACT SYMPTOMS: ICD-10-CM

## 2024-10-28 DIAGNOSIS — M99.01 SOMATIC DYSFUNCTION OF SPINE, CERVICAL: ICD-10-CM

## 2024-10-28 DIAGNOSIS — M54.2 CERVICALGIA: Primary | ICD-10-CM

## 2024-10-28 PROCEDURE — 98926 OSTEOPATH MANJ 3-4 REGIONS: CPT | Performed by: STUDENT IN AN ORGANIZED HEALTH CARE EDUCATION/TRAINING PROGRAM

## 2024-10-28 RX ORDER — TAMSULOSIN HYDROCHLORIDE 0.4 MG/1
0.4 CAPSULE ORAL EVERY EVENING
Qty: 90 CAPSULE | Refills: 3 | Status: SHIPPED | OUTPATIENT
Start: 2024-10-28 | End: 2024-10-28 | Stop reason: SDUPTHER

## 2024-10-28 RX ORDER — TAMSULOSIN HYDROCHLORIDE 0.4 MG/1
0.4 CAPSULE ORAL EVERY EVENING
Qty: 90 CAPSULE | Refills: 3 | Status: SHIPPED | OUTPATIENT
Start: 2024-10-28

## 2024-10-28 NOTE — PROGRESS NOTES
Interval history: this is a 84 year old man presenting for reevaluation of neck pain due to cervical myofascial strain.  He is presenting for treatment with OMT.  He has been using heating pads as well as taking Celebrex to improve his neck pain and has recently returned from a short vacation to [facility]. Since our last visit, he has scaled back his analgesic regimen to see where he is at, and has had occasional twinges of pain while lying on his right side but is overall improving (\"slightly\").     /70   Pulse 69   Wt 67.6 kg (149 lb)   SpO2 100%   BMI 22.66 kg/m²     Procedure Note  After history taking and examination of patient, it was determined that a beneficial treatment modality for their complaint would be osteopathic manipulative therapy (OMT) the nature of OMT, treatment goals, mechanism of action, and side effects were extensively discussed with this patient, who did wish to proceed with the procedure.  The following body systems were treated with osteopathy during our office visit today: head, cervical region, right upper extremity.  Osteopathic findings include left OA extended, left longus coli hypertonic with tenderpoint, right scapular portion of trapezius hypertonic with tenderpoint.  Treatment was performed using FPR, CSC, soft tissue myofascia techniques. After treatment, the patient was reevaluated and the following physical exam findings were appreciated: resolution of s/d.  We determined that the next best time for the patient to return to the office for additional treatment would be in one week PRN.    At this point, her treatment was concluded, there were no complications, there were no further questions.  Patient tolerated this procedure very well.    Micheal Horne,   10/28/2024  8:32 AM    Additionally, he is requesting a refill of his Tamsulosin. Refill provided.

## 2024-11-06 ENCOUNTER — OFFICE VISIT (OUTPATIENT)
Dept: FAMILY MEDICINE CLINIC | Age: 84
End: 2024-11-06

## 2024-11-06 VITALS
BODY MASS INDEX: 22.66 KG/M2 | HEART RATE: 62 BPM | DIASTOLIC BLOOD PRESSURE: 60 MMHG | WEIGHT: 149 LBS | OXYGEN SATURATION: 98 % | SYSTOLIC BLOOD PRESSURE: 122 MMHG

## 2024-11-06 DIAGNOSIS — M54.2 CERVICALGIA: Primary | ICD-10-CM

## 2024-11-06 DIAGNOSIS — S16.1XXD CERVICAL MUSCLE STRAIN, SUBSEQUENT ENCOUNTER: ICD-10-CM

## 2024-11-06 DIAGNOSIS — M99.07 SOMATIC DYSFUNCTION OF RIGHT UPPER EXTREMITY: ICD-10-CM

## 2024-11-06 DIAGNOSIS — M99.00 SOMATIC DYSFUNCTION OF HEAD REGION: ICD-10-CM

## 2024-11-06 DIAGNOSIS — M99.01 SOMATIC DYSFUNCTION OF SPINE, CERVICAL: ICD-10-CM

## 2024-11-06 ASSESSMENT — ENCOUNTER SYMPTOMS
VOMITING: 0
SORE THROAT: 0
ABDOMINAL PAIN: 0
WHEEZING: 0
COUGH: 0
NAUSEA: 0
DIARRHEA: 0
BACK PAIN: 0
SINUS PAIN: 0

## 2024-11-06 NOTE — PROGRESS NOTES
HPI Notes    Name: Juan Sarabia  : 1940         Chief Complaint:     Chief Complaint   Patient presents with    Neck Pain     Patient here for omt. Pain is improving during the day but pain returns at night.       History of Present Illness:        HPI    Interval history: This is a 84-year-old gentleman presenting for reevaluation of cervicalgia and cervical muscle strain due to somatic dysfunction.  Since prior outpatient visit, he relates that his pain is improving during the day but returns mainly at night. He continues to use Celebrex BID and has not taken any acetaminophen at all for the last 24 hours. He is reporting improvement with aROM and subjective pain but the nocturnal pain is more of a bothersome ache.     Past Medical History:     Past Medical History:   Diagnosis Date    GERD (gastroesophageal reflux disease)     Hyperlipidemia       Reviewed all health maintenance requirements and ordered appropriate tests  Health Maintenance Due   Topic Date Due    Polio vaccine (2 of 3 - Adult catch-up series) 10/07/2009    Hepatitis B vaccine (2 of 3 - 19+ 3-dose series) 2010       Past Surgical History:     Past Surgical History:   Procedure Laterality Date    COLONOSCOPY      polyp    COLONOSCOPY  2016    sigmoid diverticula    CYSTOSCOPY      [facility]-     INTRACAPSULAR CATARACT EXTRACTION      Bilat    PROSTATE SURGERY      [place]- Dr. Spaulding (15 years ago)    TONSILLECTOMY          Medications:       Prior to Admission medications    Medication Sig Start Date End Date Taking? Authorizing Provider   tamsulosin (FLOMAX) 0.4 MG capsule Take 1 capsule by mouth every evening 10/28/24  Yes Micheal Horne DO   tiZANidine (ZANAFLEX) 4 MG tablet Take 1 tablet by mouth nightly as needed (neck pain) 10/10/24  Yes Micheal Horne DO   celecoxib (CELEBREX) 100 MG capsule Take 1 capsule by mouth 2 times daily 10/10/24  Yes Micheal Horne DO   ezetimibe (ZETIA) 10 MG tablet Take 1

## 2024-11-06 NOTE — PATIENT INSTRUCTIONS
SURVEY:    You may be receiving a survey from [facility] regarding your visit today.    You may get this in the mail, through your MyChart or in your email.     Please complete the survey to enable us to provide the highest quality of care to you and your family.    If you cannot score us as very good ( 5 Stars) on any question, please feel free to call the office to discuss how we could have made your experience exceptional.     Thank you.    Clinical Care Team:  Dr. Micheal Horne, DO Tete Salazar LPN    Triage:  Myriam Degroot CMA    Clerical Team:  Myriam Davila

## 2024-11-14 ENCOUNTER — HOSPITAL ENCOUNTER (OUTPATIENT)
Dept: PHYSICAL THERAPY | Age: 84
Setting detail: THERAPIES SERIES
Discharge: HOME | End: 2024-11-14
Attending: STUDENT IN AN ORGANIZED HEALTH CARE EDUCATION/TRAINING PROGRAM
Payer: MEDICARE

## 2024-11-14 PROCEDURE — 97161 PT EVAL LOW COMPLEX 20 MIN: CPT

## 2024-11-14 PROCEDURE — 97140 MANUAL THERAPY 1/> REGIONS: CPT

## 2024-11-14 ASSESSMENT — PAIN DESCRIPTION - LOCATION: LOCATION: NECK;SHOULDER

## 2024-11-14 ASSESSMENT — PAIN SCALES - GENERAL: PAINLEVEL_OUTOF10: 2

## 2024-11-14 NOTE — PLAN OF CARE
[facility]       Phone: [phone]   Date: 2024                      Outpatient Physical Therapy  Fax: [phone]    ACCT #: [id number]                     Plan of Care  [facility]#: [id number]  Patient: Juan Sarabia  : 1940    Referring Provider (secondary): Dr. Horne    Diagnosis: Cervical muscle strain,cervalgia  Onset Date: 24  Treatment Diagnosis: Neck Pain    Assessment  Body Structures, Functions, Activity Limitations Requiring Skilled Therapeutic Intervention: Decreased ADL status, Decreased ROM, Decreased strength, Increased pain  Assessment: Patient presents with reoccurrent cervical pain with OA. Completed therex and manual therapy per Doc flow. educated patient on and issued HEP handout. Plan for 1xwk due to patient reports he is currently not having pain.  Therapy Prognosis: Good    Treatment Plan   Days: 5 times per week   weeks Total # of Visits Approved: 10    Patient Education/HEP, Therapeutic Exercise, Manual Therapy: Myofacial Release/Cupping, and Manual Therapy: Mobilization/Manipulation     Goals  Short Term Goals  Time Frame for Short Term Goals: 6  Short Term Goal 1: Partient to be educated on and independent with HEP  Short Term Goal 2: Increase strength R shld flexion 4+/5 to be able to lift 40# salt bags  Long Term Goals  Time Frame for Long Term Goals : 10  Long Term Goal 1: No neck pain, 0/10 x3 days  Long Term Goal 2: Improve functional activities with Neck Disability Index score <12/50 (from 1850)     Shanita Julio, PT   Date: 2024    ______________________________________ Date: 2024  Physician Signature  By signing above or cosigning electronically, I have reviewed this Plan of Care and certify a need for medically necessary rehabilitation services.

## 2024-11-14 NOTE — THERAPY EVALUATION
Phone: [phone]                       [facility]         Fax: [phone]                      Outpatient Physical Therapy                                                                      Evaluation    Date: 2024  Patient: Juan Sarabia  : 1940  ACCT #: [id number]    Referring Provider (secondary): Dr. Horne    Diagnosis: Cervical muscle strain,cervalgia    Treatment Diagnosis: Neck Pain  Onset Date: 24  PT Insurance Information: Aetna M[employer]  Total # of Visits Approved: 10 Per Physician Order  Total # of Visits to Date: 1     Subjective     Additional Pertinent Hx: Pain in neck and R shld started with trying to lift 40 pound salt bag about 3-4 wks ago.  manipulated neck several sessions. Patient reports pain 0-2/10 currentlyPatient main c/o pain interfering sleep; also goes to toilet 3 times a night.  Hx- enlarged prostate, did have laser procedure for prostate,, OA. Takes celebrex and tylenol PRN for pain. Patient walks 2-3 miles a day for ex, does chair yoga one time a wk.  Pain Assessment  Pain Level: 2  Pain Location: Neck, Shoulder  Social/Functional History  Lives With: Spouse  Occupation: Retired       Objective  Spine  Cervical: rotation L 65 degrees, rotation R 60 degrees  Joint Mobility  Spine: tightness upper thoracic and upper cervical  Strength RUE  Strength RUE: Exception  R Shoulder Flexion: 4-/5  R Shoulder Horizontal ABduction: 4-/5  Strength LUE  Comment: L shld 4+/5    AROM RUE (degrees)  RUE AROM : WFL  AROM LUE (degrees)  LUE AROM : WFL        Additional Measures  Special Tests: Neck Disability Index 18/50     WB Status: WFL     Assessment  Body Structures, Functions, Activity Limitations Requiring Skilled Therapeutic Intervention: Decreased ADL status, Decreased ROM, Decreased strength, Increased pain  Assessment: Patient presents with reoccurrent cervical pain with OA. Completed therex and manual therapy per Doc flow. educated patient on and

## 2024-11-19 ENCOUNTER — HOSPITAL ENCOUNTER (OUTPATIENT)
Dept: PHYSICAL THERAPY | Age: 84
Setting detail: THERAPIES SERIES
Discharge: HOME | End: 2024-11-19
Attending: STUDENT IN AN ORGANIZED HEALTH CARE EDUCATION/TRAINING PROGRAM
Payer: MEDICARE

## 2024-11-19 PROCEDURE — 97110 THERAPEUTIC EXERCISES: CPT

## 2024-11-19 PROCEDURE — 97140 MANUAL THERAPY 1/> REGIONS: CPT

## 2024-11-19 ASSESSMENT — PAIN DESCRIPTION - LOCATION: LOCATION: NECK

## 2024-11-19 ASSESSMENT — PAIN SCALES - GENERAL: PAINLEVEL_OUTOF10: 1

## 2024-11-19 NOTE — PROGRESS NOTES
Phone: [phone]                 [facility]      Fax: [phone]                            Outpatient Physical Therapy                                                                            Daily Note    Date: 2024  Patient Name: Juan Sarabia        MRN: [id number]   ACCT#:  [id number]  : 1940  (84 y.o.)    Referring Provider (secondary): Dr. Horne         Diagnosis: Cervical muscle strain,cervalgia  Treatment Diagnosis: Neck Pain    Onset Date: 24  PT Insurance Information: Aetna [facility]  Total # of Visits Approved: 10 Per Physician Order  Total # of Visits to Date: 2  Plan of Care/Certification Expiration Date: 24    Pre-Treatment Pain:  1/10     Assessment  Assessment: Pain 1/10 in neck, mostly just stiffness. Completed therex and manual therapy per Doc Flow. Reviewed HEP, patient demonstrated and reports compliance. [place] going [place] of [place]-9 days and wnats to hold PT x2 wks and see how he feels.    Plan  Continue with current plan of care    Exercises/Modalities/Manual:  See DocFlow Sheet    Education: On ex form and importance       Goals  (Total # of Visits to Date: 2)   Short Term Goals  Time Frame for Short Term Goals: 6  Short Term Goal 1: Partient to be educated on and independent with HEP-Met  Short Term Goal 2: Increase strength R shld flexion 4+/5 to be able to lift 40# salt bags    Long Term Goals  Time Frame for Long Term Goals : 10  Long Term Goal 1: No neck pain, 0/10 x3 days  Long Term Goal 2: Improve functional activities with Neck Disability Index score <12/50 (from 18/50)    Post Treatment Pain:  1/10    Time In: 8:00    Time Out : 8:35        Timed Code Treatment Minutes: 35 Minutes  Total Treatment Time: 35 Minutes    Shanita Julio, PT     Date: 2024

## 2024-12-05 ENCOUNTER — HOSPITAL ENCOUNTER (OUTPATIENT)
Dept: PHYSICAL THERAPY | Age: 84
Setting detail: THERAPIES SERIES
Discharge: HOME | End: 2024-12-05
Attending: STUDENT IN AN ORGANIZED HEALTH CARE EDUCATION/TRAINING PROGRAM
Payer: MEDICARE

## 2024-12-05 ENCOUNTER — OFFICE VISIT (OUTPATIENT)
Dept: UROLOGY | Age: 84
End: 2024-12-05
Payer: MEDICARE

## 2024-12-05 VITALS
DIASTOLIC BLOOD PRESSURE: 64 MMHG | WEIGHT: 153 LBS | HEIGHT: 68 IN | BODY MASS INDEX: 23.19 KG/M2 | SYSTOLIC BLOOD PRESSURE: 118 MMHG

## 2024-12-05 DIAGNOSIS — N50.3 EPIDIDYMAL CYST: ICD-10-CM

## 2024-12-05 DIAGNOSIS — N13.8 BPH WITH OBSTRUCTION/LOWER URINARY TRACT SYMPTOMS: Primary | ICD-10-CM

## 2024-12-05 DIAGNOSIS — N40.1 BPH WITH OBSTRUCTION/LOWER URINARY TRACT SYMPTOMS: Primary | ICD-10-CM

## 2024-12-05 PROCEDURE — 1123F ACP DISCUSS/DSCN MKR DOCD: CPT | Performed by: PHYSICIAN ASSISTANT

## 2024-12-05 PROCEDURE — 99214 OFFICE O/P EST MOD 30 MIN: CPT | Performed by: PHYSICIAN ASSISTANT

## 2024-12-05 PROCEDURE — 51798 US URINE CAPACITY MEASURE: CPT | Performed by: PHYSICIAN ASSISTANT

## 2024-12-05 PROCEDURE — 97110 THERAPEUTIC EXERCISES: CPT

## 2024-12-05 PROCEDURE — 1159F MED LIST DOCD IN RCRD: CPT | Performed by: PHYSICIAN ASSISTANT

## 2024-12-05 PROCEDURE — 97140 MANUAL THERAPY 1/> REGIONS: CPT

## 2024-12-05 ASSESSMENT — PAIN DESCRIPTION - LOCATION: LOCATION: NECK;SHOULDER

## 2024-12-05 ASSESSMENT — PAIN SCALES - GENERAL: PAINLEVEL_OUTOF10: 1

## 2024-12-05 NOTE — PROGRESS NOTES
HPI:      Patient is a 84 y.o. male in no acute distress.  He is alert and oriented to person, place, and time.       ~15 years ago TURP - Dr. Mast     12/2017 -  right scrotal pain and swelling.  Patient states he first noticed symptoms 3-4 months ago.  He describes his pain as a mild discomfort.  He states that since the onset of his symptoms the pain or swelling has not become any worse.  Dr. Pham did order a scrotal ultrasound on 12/11/17.  This was independently reviewed at today's visit and show multiple right epididymal cyst and a right extratesticular cyst.  There is no hydroceles bilaterally.  Patient denies any known trauma to the area.  He denies perineal or penile pain.  He denies dysuria, gross hematuria, frequency, urgency, or incontinence.  He has never seen urology in the past.     Follow up as needed     Today:  Patient is here today for follow-up epididymal cyst, nocturia, constipation, BPH.  Patient continues to take Flomax.  Patient does have a known benign scrotal cyst. He states that laying on his side does increase swelling at times, but overall doing well. We have made plans to check a scrotal ultrasound every other year for this.  He is not due for this until next year.  Nocturia x 2-3. States that his stream seems to be weaker, but not to bothersome. Bladderscan performed in office today:  Pt voided - 100 mL, PVR -  16 mL    Past Medical History:   Diagnosis Date    GERD (gastroesophageal reflux disease)     Hyperlipidemia      Past Surgical History:   Procedure Laterality Date    COLONOSCOPY  2006    polyp    COLONOSCOPY  01/04/2016    sigmoid diverticula    CYSTOSCOPY      [facility]-     INTRACAPSULAR CATARACT EXTRACTION      Bilat    PROSTATE SURGERY      [place]- Dr. Spaulding (15 years ago)    TONSILLECTOMY       Outpatient Encounter Medications as of 12/5/2024   Medication Sig Dispense Refill    tamsulosin (FLOMAX) 0.4 MG capsule Take 1 capsule by mouth every evening 90 capsule 3

## 2024-12-05 NOTE — PROGRESS NOTES
Phone: [phone]                 [facility]      Fax: [phone]                            Outpatient Physical Therapy                                                                            Daily Note    Date: 2024  Patient Name: Juan Sarabia        MRN: [id number]   ACCT#:  [id number]  : 1940  (84 y.o.)  Referring Provider (secondary): Dr. Horne         Diagnosis: Cervical muscle strain,cervalgia  Treatment Diagnosis: Neck Pain    Onset Date: 24  PT Insurance Information: Aetna [facility]  Total # of Visits Approved: 10 Per Physician Order  Total # of Visits to Date: 3  Plan of Care/Certification Expiration Date: 24    Pre-Treatment Pain:  1/10     Assessment  Assessment: Pain 1/10 in neck currently, but pain 4-5/10 at night and when first gets up in mornings. Completed therex and manual therapy per Doc flow. Reviewed HEP and education on correct form. Education on improving sleep and nutrients, and decreasing processed foods. Plan to recheck shld strength and progres strengthening ex as appropriate.    Plan  Continue with current plan of care    Exercises/Modalities/Manual:  See DocFlow Sheet    Education:  Reviewed HEP and education on correct form. Education on improving sleep and nutrients, and decreasing processed foods.     Goals  (Total # of Visits to Date: 3)   Short Term Goals  Time Frame for Short Term Goals: 6  Short Term Goal 1: Partient to be educated on and independent with HEP-Met  Short Term Goal 2: Increase strength R shld flexion 4+/5 to be able to lift 40# salt bags    Long Term Goals  Time Frame for Long Term Goals : 10  Long Term Goal 1: No neck pain, 0/10 x3 days  Long Term Goal 2: Improve functional activities with Neck Disability Index score <12/50 (from 18/50)    Post Treatment Pain:  0/10    Time In: 8:45    Time Out : 9:35      Timed Code Treatment Minutes: 45 Minutes  Total Treatment Time: 50 Minutes    Shanita Julio, PT     Date: 2024

## 2024-12-11 ENCOUNTER — HOSPITAL ENCOUNTER (OUTPATIENT)
Dept: PHYSICAL THERAPY | Age: 84
Setting detail: THERAPIES SERIES
Discharge: HOME | End: 2024-12-11
Attending: STUDENT IN AN ORGANIZED HEALTH CARE EDUCATION/TRAINING PROGRAM
Payer: MEDICARE

## 2024-12-11 PROCEDURE — 97140 MANUAL THERAPY 1/> REGIONS: CPT

## 2024-12-11 PROCEDURE — 97110 THERAPEUTIC EXERCISES: CPT

## 2024-12-11 NOTE — PROGRESS NOTES
Phone: [phone]                 [facility]      Fax: [phone]                            Outpatient Physical Therapy                                                                            Daily Note    Date: 2024  Patient Name: Juan Sarabia        MRN: [id number]   ACCT#:  [id number]  : 1940  (84 y.o.)  Referring Provider (secondary): Dr. Horne         Diagnosis: Cervical muscle strain,cervalgia  Treatment Diagnosis: Neck Pain    Onset Date: 24  PT Insurance Information: Aetna [facility]  Total # of Visits Approved: 10 Per Physician Order  Total # of Visits to Date: 4  Plan of Care/Certification Expiration Date: 24    Pre-Treatment Pain:  0/10     Assessment  Assessment: Pain 0/10 currently. Completed therex and manual therapy per Doc flow. EDucated patient on proper body mechanics with lifting and on progressing UE strengthening. Educated pt on and issued HEP handout on strengthening. Plan to recheck UE strength and Neck disability Index next visit.    Plan  Continue with current plan of care    Exercises/Modalities/Manual:  See DocFlow Sheet    Education: EDucated patient on proper body mechanics with lifting and on progressing UE strengthening. Educated pt on and issued HEP handout on strengthening.     Goals  (Total # of Visits to Date: 4)   Short Term Goals  Time Frame for Short Term Goals: 6  Short Term Goal 1: Partient to be educated on and independent with HEP-Met  Short Term Goal 2: Increase strength R shld flexion 4+/5 to be able to lift 40# salt bags    Long Term Goals  Time Frame for Long Term Goals : 10  Long Term Goal 1: No neck pain, 0/10 x3 days  Long Term Goal 2: Improve functional activities with Neck Disability Index score <12/50 (from 18/50)    Post Treatment Pain:  0/10    Time In: 9:45    Time Out : 10:25        Timed Code Treatment Minutes: 40 Minutes  Total Treatment Time: 40 Minutes    Shanita Julio, PT     Date: 2024

## 2024-12-18 ENCOUNTER — HOSPITAL ENCOUNTER (OUTPATIENT)
Dept: PHYSICAL THERAPY | Age: 84
Setting detail: THERAPIES SERIES
Discharge: HOME | End: 2024-12-18
Attending: STUDENT IN AN ORGANIZED HEALTH CARE EDUCATION/TRAINING PROGRAM
Payer: MEDICARE

## 2024-12-18 PROCEDURE — 97140 MANUAL THERAPY 1/> REGIONS: CPT

## 2024-12-18 PROCEDURE — 97110 THERAPEUTIC EXERCISES: CPT

## 2024-12-18 ASSESSMENT — PAIN SCALES - GENERAL: PAINLEVEL_OUTOF10: 1

## 2024-12-18 NOTE — PROGRESS NOTES
Phone: [phone]                 [facility]      Fax: [phone]                            Outpatient Physical Therapy                                                                            Daily Note    Date: 2024  Patient Name: Juan Sarabia        MRN: [id number]   ACCT#:  [id number]  : 1940  (84 y.o.)  Referring Provider (secondary): Dr. Honre         Diagnosis: Cervical muscle strain,cervalgia  Treatment Diagnosis: Neck Pain    Onset Date: 24  PT Insurance Information: Aetna [facility]  Total # of Visits Approved: 10 Per Physician Order  Total # of Visits to Date: 5  Plan of Care/Certification Expiration Date: 24    Pre-Treatment Pain:  1/10     Assessment  Assessment: Pain 1/10 in B upper trap muscles, lower neck, Completed therex and manual therpy per Doc Flow. Reviewed HEP, education on progressing reps and appropriate frequency. Strength B shld flexion 4+/5. Neck disability Index 4/50. Discharge to HEP..    Plan  Discharge    Exercises/Modalities/Manual:  See DocFlow Sheet    Education: On ex form and continuation of HEP        Goals  (Total # of Visits to Date: 5)   Short Term Goals  Time Frame for Short Term Goals: 6  Short Term Goal 1: Partient to be educated on and independent with HEP-Met  Short Term Goal 2: Increase strength R shld flexion 4+/5 to be able to lift 40# salt bags-Met    Long Term Goals  Time Frame for Long Term Goals : 10  Long Term Goal 1: No neck pain, 0/10 x3 days-Not Met  Long Term Goal 2: Improve functional activities with Neck Disability Index score <12/50 (from 18/50)-Met    Post Treatment Pain:  0/10    Time In: 9:45    Time Out : 10;30        Timed Code Treatment Minutes: 45 Minutes  Total Treatment Time: 40 Minutes    Shanita Julio, PT     Date: 2024

## 2024-12-18 NOTE — DISCHARGE SUMMARY
Phone: [phone]                 [facility]             Fax: [phone]                            Outpatient Physical Therapy                                                                    Discharge Summary    Patient: Juan Sarabia  : 1940  ACCT #: [id number]   Referring Provider (secondary): Dr. Horne      Diagnosis: Cervical muscle strain,cervalgia  Date Treatment Initiated: 24  Date of Last Treatment: 24    PT Visit Information  Onset Date: 24  PT Insurance Information: Aetna [facility]  Total # of Visits Approved: 10  Total # of Visits to Date: 5    Frequency/Duration  1 times per week [id number]    Treatment Received  Patient Education/HEP, Therapeutic Exercise, Manual Therapy: Myofacial Release/Cupping, and Manual Therapy: Mobilization/Manipulation    Pain Level: 1    Assessment  Assessment: Pain 1/10 in B upper trap muscles, lower neck, Completed therex and manual therpy per Doc Flow. Reviewed HEP, education on progressing reps and appropriate frequency. Strength B shld flexion 4+/5. Neck disability Index 4/50. Discharge to HEP..       Goals  Short Term Goals  Time Frame for Short Term Goals: 6  Short Term Goal 1: Partient to be educated on and independent with HEP-Met  Short Term Goal 2: Increase strength R shld flexion 4+/5 to be able to lift 40# salt bags-Met    Long Term Goals  Time Frame for Long Term Goals : 10  Long Term Goal 1: No neck pain, 0/10 x3 days-Not Met  Long Term Goal 2: Improve functional activities with Neck Disability Index score <12/50 (from 18/50)-Met    Reason for Discharge  Goals Met    Comments:  Thank you for this referral      Shanita Julio, PT  Date: 2024

## 2025-01-10 RX ORDER — LEVOCETIRIZINE DIHYDROCHLORIDE 5 MG/1
5 TABLET, FILM COATED ORAL NIGHTLY
Qty: 90 TABLET | Refills: 3 | Status: SHIPPED | OUTPATIENT
Start: 2025-01-10

## 2025-01-10 NOTE — TELEPHONE ENCOUNTER
Xyzal 5 mg    Short supply to [facility] [place]    Juan is waiting for his mail order to come in.    Health Maintenance   Topic Date Due    Polio vaccine (2 of 3 - Adult catch-up series) 10/07/2009    Hepatitis B vaccine (2 of 3 - 19+ 3-dose series) 03/30/2010    Annual Wellness Visit (Medicare Advantage)  01/01/2025    Depression Screen  02/01/2025    DTaP/Tdap/Td vaccine (3 - Td or Tdap) 12/11/2029    Flu vaccine  Completed    Shingles vaccine  Completed    Pneumococcal 65+ years Vaccine  Completed    COVID-19 Vaccine  Completed    Respiratory Syncytial Virus (RSV) Pregnant or age 60 yrs+  Completed    Hepatitis A vaccine  Aged Out    Hib vaccine  Aged Out    Meningococcal (ACWY) vaccine  Aged Out             (applicable per patient's age: Cancer Screenings, Depression Screening, Fall Risk Screening, Immunizations)    Hemoglobin A1C (%)   Date Value   02/01/2024 5.6   01/26/2023 5.7   01/19/2021 5.6     AST (U/L)   Date Value   02/01/2024 24     ALT (U/L)   Date Value   02/01/2024 18     BUN (mg/dL)   Date Value   02/01/2024 15      (goal A1C is < 7)   (goal LDL is <100) need 30-50% reduction from baseline     BP Readings from Last 3 Encounters:   12/05/24 118/64   11/06/24 122/60   10/28/24 136/70    (goal /80)      All Future Testing planned in [facility]:  Lab Frequency Next Occurrence   US SCROTUM W LIMITED DUPLEX Once 12/05/2025       Next Visit Date:  Future Appointments   Date Time Provider Department Center   12/11/2025  1:00 PM Valentino Medina, JUAN PABLO [facility]            Patient Active Problem List:     Pure hypercholesterolemia     Esophageal reflux     Other abnormal glucose     Hypertrophy of prostate without urinary obstruction and other lower urinary tract symptoms (LUTS)     History of colon polyps     Epididymal cyst     Scrotal pain     Seasonal allergies     Other hemorrhoids

## 2025-02-11 ENCOUNTER — HOSPITAL ENCOUNTER (OUTPATIENT)
Age: 85
Discharge: HOME | End: 2025-02-11
Payer: MEDICARE

## 2025-02-11 ENCOUNTER — OFFICE VISIT (OUTPATIENT)
Dept: FAMILY MEDICINE CLINIC | Age: 85
End: 2025-02-11

## 2025-02-11 VITALS
OXYGEN SATURATION: 98 % | DIASTOLIC BLOOD PRESSURE: 60 MMHG | HEIGHT: 68 IN | SYSTOLIC BLOOD PRESSURE: 102 MMHG | BODY MASS INDEX: 23.19 KG/M2 | WEIGHT: 153 LBS | HEART RATE: 71 BPM

## 2025-02-11 DIAGNOSIS — Z13.1 SCREENING FOR DIABETES MELLITUS: ICD-10-CM

## 2025-02-11 DIAGNOSIS — E78.00 PURE HYPERCHOLESTEROLEMIA: ICD-10-CM

## 2025-02-11 DIAGNOSIS — Z12.5 SCREENING FOR PROSTATE CANCER: ICD-10-CM

## 2025-02-11 DIAGNOSIS — Z00.00 MEDICARE ANNUAL WELLNESS VISIT, SUBSEQUENT: ICD-10-CM

## 2025-02-11 DIAGNOSIS — Z00.00 MEDICARE ANNUAL WELLNESS VISIT, SUBSEQUENT: Primary | ICD-10-CM

## 2025-02-11 LAB
ALBUMIN SERPL-MCNC: 4.1 G/DL (ref 3.5–5.2)
ALBUMIN/GLOB SERPL: 1.6 {RATIO} (ref 1–2.5)
ALP SERPL-CCNC: 81 U/L (ref 40–129)
ALT SERPL-CCNC: 17 U/L (ref 5–41)
ANION GAP SERPL CALCULATED.3IONS-SCNC: 13 MMOL/L (ref 9–17)
AST SERPL-CCNC: 28 U/L
BASOPHILS # BLD: 0.05 K/UL (ref 0–0.2)
BASOPHILS NFR BLD: 1 % (ref 0–2)
BILIRUB SERPL-MCNC: 0.6 MG/DL (ref 0.3–1.2)
BUN SERPL-MCNC: 11 MG/DL (ref 8–23)
CALCIUM SERPL-MCNC: 9.2 MG/DL (ref 8.6–10.4)
CHLORIDE SERPL-SCNC: 98 MMOL/L (ref 98–107)
CHOLEST SERPL-MCNC: 196 MG/DL (ref 0–199)
CHOLESTEROL/HDL RATIO: 4.5
CO2 SERPL-SCNC: 24 MMOL/L (ref 20–31)
CREAT SERPL-MCNC: 1 MG/DL (ref 0.7–1.2)
EOSINOPHIL # BLD: 0.12 K/UL (ref 0–0.4)
EOSINOPHILS RELATIVE PERCENT: 2 % (ref 0–5)
ERYTHROCYTE [DISTWIDTH] IN BLOOD BY AUTOMATED COUNT: 11.7 % (ref 12.1–15.2)
EST. AVERAGE GLUCOSE BLD GHB EST-MCNC: 114 MG/DL
GFR, ESTIMATED: 75 ML/MIN/1.73M2
GLUCOSE SERPL-MCNC: 105 MG/DL (ref 70–99)
HBA1C MFR BLD: 5.6 % (ref 4–6)
HCT VFR BLD AUTO: 42.3 % (ref 41–53)
HDLC SERPL-MCNC: 44 MG/DL
HGB BLD-MCNC: 14.6 G/DL (ref 13.5–17.5)
IMM GRANULOCYTES # BLD AUTO: 0.01 K/UL (ref 0–0.3)
IMM GRANULOCYTES NFR BLD: 0 % (ref 0–5)
LDLC SERPL CALC-MCNC: 132 MG/DL (ref 0–100)
LYMPHOCYTES NFR BLD: 0.61 K/UL (ref 1–4.8)
LYMPHOCYTES RELATIVE PERCENT: 11 % (ref 13–44)
MCH RBC QN AUTO: 31.1 PG (ref 26–34)
MCHC RBC AUTO-ENTMCNC: 34.5 G/DL (ref 31–37)
MCV RBC AUTO: 90.2 FL (ref 80–100)
MONOCYTES NFR BLD: 0.54 K/UL (ref 0–1)
MONOCYTES NFR BLD: 10 % (ref 5–9)
NEUTROPHILS NFR BLD: 76 % (ref 39–75)
NEUTS SEG NFR BLD: 4.33 K/UL (ref 2.1–6.5)
PLATELET # BLD AUTO: 238 K/UL (ref 140–450)
PMV BLD AUTO: 9.6 FL (ref 6–12)
POTASSIUM SERPL-SCNC: 4.1 MMOL/L (ref 3.7–5.3)
PROT SERPL-MCNC: 6.7 G/DL (ref 6.4–8.3)
RBC # BLD AUTO: 4.69 M/UL (ref 4.5–5.9)
SODIUM SERPL-SCNC: 135 MMOL/L (ref 135–144)
TRIGL SERPL-MCNC: 99 MG/DL
VLDLC SERPL CALC-MCNC: 20 MG/DL (ref 1–30)
WBC OTHER # BLD: 5.7 K/UL (ref 3.5–11)

## 2025-02-11 PROCEDURE — 85025 COMPLETE CBC W/AUTO DIFF WBC: CPT

## 2025-02-11 PROCEDURE — 80053 COMPREHEN METABOLIC PANEL: CPT

## 2025-02-11 PROCEDURE — 83036 HEMOGLOBIN GLYCOSYLATED A1C: CPT

## 2025-02-11 PROCEDURE — 80061 LIPID PANEL: CPT

## 2025-02-11 PROCEDURE — 36415 COLL VENOUS BLD VENIPUNCTURE: CPT

## 2025-02-11 SDOH — ECONOMIC STABILITY: FOOD INSECURITY: WITHIN THE PAST 12 MONTHS, THE FOOD YOU BOUGHT JUST DIDN'T LAST AND YOU DIDN'T HAVE MONEY TO GET MORE.: NEVER TRUE

## 2025-02-11 SDOH — ECONOMIC STABILITY: FOOD INSECURITY: WITHIN THE PAST 12 MONTHS, YOU WORRIED THAT YOUR FOOD WOULD RUN OUT BEFORE YOU GOT MONEY TO BUY MORE.: NEVER TRUE

## 2025-02-11 SDOH — ECONOMIC STABILITY: INCOME INSECURITY: IN THE LAST 12 MONTHS, WAS THERE A TIME WHEN YOU WERE NOT ABLE TO PAY THE MORTGAGE OR RENT ON TIME?: NO

## 2025-02-11 SDOH — HEALTH STABILITY: PHYSICAL HEALTH: ON AVERAGE, HOW MANY MINUTES DO YOU ENGAGE IN EXERCISE AT THIS LEVEL?: 60 MIN

## 2025-02-11 SDOH — ECONOMIC STABILITY: TRANSPORTATION INSECURITY
IN THE PAST 12 MONTHS, HAS THE LACK OF TRANSPORTATION KEPT YOU FROM MEDICAL APPOINTMENTS OR FROM GETTING MEDICATIONS?: NO

## 2025-02-11 SDOH — ECONOMIC STABILITY: TRANSPORTATION INSECURITY
IN THE PAST 12 MONTHS, HAS LACK OF TRANSPORTATION KEPT YOU FROM MEETINGS, WORK, OR FROM GETTING THINGS NEEDED FOR DAILY LIVING?: NO

## 2025-02-11 SDOH — HEALTH STABILITY: PHYSICAL HEALTH: ON AVERAGE, HOW MANY DAYS PER WEEK DO YOU ENGAGE IN MODERATE TO STRENUOUS EXERCISE (LIKE A BRISK WALK)?: 7 DAYS

## 2025-02-11 ASSESSMENT — LIFESTYLE VARIABLES
HOW MANY STANDARD DRINKS CONTAINING ALCOHOL DO YOU HAVE ON A TYPICAL DAY: 1 OR 2
HOW OFTEN DO YOU HAVE SIX OR MORE DRINKS ON ONE OCCASION: 1
HOW MANY STANDARD DRINKS CONTAINING ALCOHOL DO YOU HAVE ON A TYPICAL DAY: 1
HOW OFTEN DO YOU HAVE A DRINK CONTAINING ALCOHOL: 2
HOW OFTEN DO YOU HAVE A DRINK CONTAINING ALCOHOL: MONTHLY OR LESS

## 2025-02-11 ASSESSMENT — PATIENT HEALTH QUESTIONNAIRE - PHQ9
SUM OF ALL RESPONSES TO PHQ QUESTIONS 1-9: 0
SUM OF ALL RESPONSES TO PHQ9 QUESTIONS 1 & 2: 0
SUM OF ALL RESPONSES TO PHQ QUESTIONS 1-9: 0
2. FEELING DOWN, DEPRESSED OR HOPELESS: NOT AT ALL
1. LITTLE INTEREST OR PLEASURE IN DOING THINGS: NOT AT ALL
SUM OF ALL RESPONSES TO PHQ QUESTIONS 1-9: 0
SUM OF ALL RESPONSES TO PHQ QUESTIONS 1-9: 0

## 2025-02-11 NOTE — PROGRESS NOTES
Medicare Annual Wellness Visit    Juan Sarabia is here for Medicare AWV    Assessment & Plan   Medicare annual wellness visit, subsequent  Comments:  Overall this is a very healthy 84-year-old gentleman. He has no major concerns today and his few medical problems are under good control. Follow up in 6 mo.  Orders:  -     Comprehensive Metabolic Panel; Future  -     CBC with Auto Differential; Future  Pure hypercholesterolemia  -     Lipid Panel; Future  Screening for prostate cancer  -     PSA Screening  Screening for diabetes mellitus  -     Hemoglobin A1C; Future     Return in 6 months (on 8/11/2025) for interval medical exam.     Subjective   The following acute and/or chronic problems were also addressed today:  He is doing well and continuing with outpatient PT from our last visit. Getting over a mild cold which has been bothering him for the last four days without major issue. He is taking Delsym which is working well to alleviate his symptoms.     He is wishing to have his typical screening labs completed today as well.     Patient's complete Health Risk Assessment and screening values have been reviewed and are found in Flowsheets. The following problems were reviewed today and where indicated follow up appointments were made and/or referrals ordered.    No Positive Risk Factors identified today.                                Objective   Vitals:    02/11/25 0822   BP: 102/60   Pulse: 71   SpO2: 98%   Weight: 69.4 kg (153 lb)   Height: 1.727 m (5' 8\")      Body mass index is 23.26 kg/m².        General Appearance: alert and oriented to person, place and time, well developed and well- nourished, in no acute distress  Skin: warm and dry, no rash or erythema  Head: normocephalic and atraumatic  Eyes: pupils equal, round, and reactive to light, extraocular eye movements intact, conjunctivae normal  ENT: tympanic membrane, external ear and ear canal normal bilaterally, nose without deformity, nasal mucosa and

## 2025-02-13 ENCOUNTER — HOSPITAL ENCOUNTER (OUTPATIENT)
Age: 85
Discharge: HOME | End: 2025-02-13
Payer: MEDICARE

## 2025-02-13 LAB — PSA SERPL-MCNC: 2.91 NG/ML (ref 0–4)

## 2025-02-13 PROCEDURE — G0103 PSA SCREENING: HCPCS

## 2025-02-13 PROCEDURE — 36415 COLL VENOUS BLD VENIPUNCTURE: CPT

## 2025-06-24 DIAGNOSIS — E78.00 PURE HYPERCHOLESTEROLEMIA: ICD-10-CM

## 2025-06-24 RX ORDER — EZETIMIBE 10 MG/1
10 TABLET ORAL DAILY
Qty: 90 TABLET | Refills: 3 | Status: SHIPPED | OUTPATIENT
Start: 2025-06-24

## 2025-06-24 NOTE — TELEPHONE ENCOUNTER
Last OV: 2/11/2025  SINA,RICK   Last RX:    Next scheduled apt: Visit date not found        Surescript requesting a refill   Medication pending for approval